# Patient Record
Sex: FEMALE | Race: WHITE | Employment: UNEMPLOYED | URBAN - METROPOLITAN AREA
[De-identification: names, ages, dates, MRNs, and addresses within clinical notes are randomized per-mention and may not be internally consistent; named-entity substitution may affect disease eponyms.]

---

## 2017-07-21 ENCOUNTER — HOSPITAL ENCOUNTER (INPATIENT)
Age: 37
LOS: 20 days | Discharge: HOME | DRG: 750 | End: 2017-08-10
Attending: PSYCHIATRY & NEUROLOGY | Admitting: PSYCHIATRY & NEUROLOGY
Payer: MEDICAID

## 2017-07-21 PROBLEM — F29 PSYCHOSIS (HCC): Status: ACTIVE | Noted: 2017-07-21

## 2017-07-21 PROCEDURE — 74011250637 HC RX REV CODE- 250/637: Performed by: PSYCHIATRY & NEUROLOGY

## 2017-07-21 PROCEDURE — 65220000003 HC RM SEMIPRIVATE PSYCH

## 2017-07-21 RX ORDER — IBUPROFEN 400 MG/1
400 TABLET ORAL
Status: DISCONTINUED | OUTPATIENT
Start: 2017-07-21 | End: 2017-08-10 | Stop reason: HOSPADM

## 2017-07-21 RX ORDER — IBUPROFEN 200 MG
1 TABLET ORAL
Status: DISCONTINUED | OUTPATIENT
Start: 2017-07-21 | End: 2017-08-10 | Stop reason: HOSPADM

## 2017-07-21 RX ORDER — LORAZEPAM 2 MG/ML
2 INJECTION INTRAMUSCULAR
Status: DISCONTINUED | OUTPATIENT
Start: 2017-07-21 | End: 2017-08-10 | Stop reason: HOSPADM

## 2017-07-21 RX ORDER — BENZTROPINE MESYLATE 2 MG/1
2 TABLET ORAL
Status: DISCONTINUED | OUTPATIENT
Start: 2017-07-21 | End: 2017-08-10 | Stop reason: HOSPADM

## 2017-07-21 RX ORDER — OLANZAPINE 5 MG/1
5 TABLET ORAL
Status: DISCONTINUED | OUTPATIENT
Start: 2017-07-21 | End: 2017-08-10 | Stop reason: HOSPADM

## 2017-07-21 RX ORDER — BENZTROPINE MESYLATE 1 MG/ML
2 INJECTION INTRAMUSCULAR; INTRAVENOUS
Status: DISCONTINUED | OUTPATIENT
Start: 2017-07-21 | End: 2017-08-10 | Stop reason: HOSPADM

## 2017-07-21 RX ORDER — CARBAMAZEPINE 100 MG/1
100 TABLET, EXTENDED RELEASE ORAL 2 TIMES DAILY
COMMUNITY
End: 2017-08-10

## 2017-07-21 RX ORDER — ACETAMINOPHEN 325 MG/1
650 TABLET ORAL
Status: DISCONTINUED | OUTPATIENT
Start: 2017-07-21 | End: 2017-08-10 | Stop reason: HOSPADM

## 2017-07-21 RX ORDER — ZOLPIDEM TARTRATE 10 MG/1
10 TABLET ORAL
Status: DISCONTINUED | OUTPATIENT
Start: 2017-07-21 | End: 2017-08-04

## 2017-07-21 RX ORDER — ADHESIVE BANDAGE
30 BANDAGE TOPICAL DAILY PRN
Status: DISCONTINUED | OUTPATIENT
Start: 2017-07-21 | End: 2017-08-10 | Stop reason: HOSPADM

## 2017-07-21 RX ORDER — LORAZEPAM 1 MG/1
1 TABLET ORAL
Status: DISCONTINUED | OUTPATIENT
Start: 2017-07-21 | End: 2017-08-10 | Stop reason: HOSPADM

## 2017-07-21 RX ADMIN — ZOLPIDEM TARTRATE 10 MG: 10 TABLET, FILM COATED ORAL at 21:56

## 2017-07-21 NOTE — IP AVS SNAPSHOT
Summary of Care Report The Summary of Care report has been created to help improve care coordination. Users with access to [employer] or [address] (Web-based application) may access additional patient information including the Discharge Summary. If you are not currently a [address] user and need more information, please call the number listed below in the [place] [address] section and ask to be connected with Medical Records. Facility Information Name Address Phone [facility] [address] [place] [id number] [phone] Patient Information Patient Name Sex DOB Hoyle Gowers ([id number]) Female 1980 Discharge Information Admitting Provider Service Area Unit Martin Velasquez MD / [address] Acute Behav [facility] / [phone] Discharge Provider Discharge Date/Time Discharge Disposition Destination Juan Reza MD / [phone] 08/10/17 1043 [facility] (none) Patient Language Language ENGLISH [13] Hospital Problems as of 8/10/2017  Never Reviewed Class Noted - Resolved Last Modified POA Active Problems * (Principal)Schizoaffective disorder, chronic condition with acute exacerbation ([facility] [address]  2017 - Present 2017 by Kiara Abarca MD Unknown Entered by Kiara Abarca MD  
  Noncompliance with treatment  2017 - Present 2017 by Kiara Abarca MD Unknown Entered by Kiara Abarca MD  
  
Non-Hospital Problems as of 8/10/2017  Never Reviewed Class Noted - Resolved Last Modified Active Problems Psychosis  2017 - Present 2017 by Kiara Abarca MD  
  Entered by Martin Velasquez MD  
  
You are allergic to the following Allergen Reactions Fish Containing Products Anaphylaxis Sea Food (Seafood) Anaphylaxis Current Discharge Medication List  
  
START taking these medications Dose & Instructions Dispensing Information Comments  
 clonazePAM 1 mg tablet Commonly known as:  Dala Pleasure Dose:  1 mg Take 1 Tab by mouth nightly for 21 days. Max Daily Amount: 1 mg. Indications: Agitation, anxiety Quantity:  21 Tab Refills:  0  
   
 divalproex  mg ER tablet Commonly known as:  DEPAKOTE ER Dose:  1500 mg Take 3 Tabs by mouth nightly for 21 days. Indications: Schizoaffective disorder Quantity:  63 Tab Refills:  0  
   
 * OLANZapine 10 mg tablet Commonly known as:  ZyPREXA Dose:  10 mg Take 1 Tab by mouth daily for 21 days. Indications: Ana associated with Bipolar Disorder Quantity:  21 Tab Refills:  0  
   
 * OLANZapine 15 mg tablet Commonly known as:  ZyPREXA Dose:  30 mg Take 2 Tabs by mouth nightly for 21 days. Indications: Ana associated with Bipolar Disorder, Schizoaffective Disorder Quantity:  42 Tab Refills:  0  
   
 * Notice: This list has 2 medication(s) that are the same as other medications prescribed for you. Read the directions carefully, and ask your doctor or other care provider to review them with you. STOP taking these medications Comments TEGretol  mg SR tablet Generic drug:  carBAMazepine XR Follow-up Information Follow up With Details Comments Contact Info [place] On 8/14/2017 intake appointment at 9:15 AM to begin process for  and psychiatric medication management. [address] [place] [address] [address] [place] [address] 
[phone] Emergency services: [phone] EHS Go on 8/16/2017 8/16/17 at 12:00 PM   Assessment will be conducted by Dewayne Phillips for mental health skill building services [address] [place] [address] Phone: [phone] Monday - 8/16/17  at 12:00 PM 
  
 None   None (395) Patient stated that they have no PCP Discharge Instructions DISCHARGE SUMMARY from Nurse The following personal items are in your possession at time of discharge: 
 
Dental Appliances: None Visual Aid: None Home Medications: None Jewelry: None Clothing: None Other Valuables: None Personal Items Sent to Safe: none PATIENT INSTRUCTIONS: 
 
 
 
What to do at Home: 
 
Recommended activity: Activity as tolerated, *  Please give a list of your current medications to your Primary Care Provider. *  Please update this list whenever your medications are discontinued, doses are 
    changed, or new medications (including over-the-counter products) are added. *  Please carry medication information at all times in case of emergency situations. These are general instructions for a healthy lifestyle: No smoking/ No tobacco products/ Avoid exposure to second hand smoke Surgeon General's Warning:  Quitting smoking now greatly reduces serious risk to your health. Obesity, smoking, and sedentary lifestyle greatly increases your risk for illness A healthy diet, regular physical exercise & weight monitoring are important for maintaining a healthy lifestyle You may be retaining fluid if you have a history of heart failure or if you experience any of the following symptoms:  Weight gain of 3 pounds or more overnight or 5 pounds in a week, increased swelling in our hands or feet or shortness of breath while lying flat in bed. Please call your doctor as soon as you notice any of these symptoms; do not wait until your next office visit. Recognize signs and symptoms of STROKE: 
 
F-face looks uneven A-arms unable to move or move unevenly S-speech slurred or non-existent T-time-call 911 as soon as signs and symptoms begin-DO NOT go Back to bed or wait to see if you get better-TIME IS BRAIN.  
 
Warning Signs of HEART ATTACK  
 
 Call 911 if you have these symptoms: 
? Chest discomfort. Most heart attacks involve discomfort in the center of the chest that lasts more than a few minutes, or that goes away and comes back. It can feel like uncomfortable pressure, squeezing, fullness, or pain. ? Discomfort in other areas of the upper body. Symptoms can include pain or discomfort in one or both arms, the back, neck, jaw, or stomach. ? Shortness of breath with or without chest discomfort. ? Other signs may include breaking out in a cold sweat, nausea, or lightheadedness. Don't wait more than five minutes to call [address]! Fast action can save your life. Calling 911 is almost always the fastest way to get lifesaving treatment. Emergency Medical Services staff can begin treatment when they arrive  up to an hour sooner than if someone gets to the hospital by car. The discharge information has been reviewed with the patient. The patient verbalized understanding. Discharge medications reviewed with the patient and appropriate educational materials and side effects teaching were provided. If I feel that I can not keep these promises and I am at risk of hurting myself or others,I will call the crisis office and speak with a crisis worker who will assist me during my crisis. [address] [place] [address] [phone] [address] [place] [address] [phone] [facility] [phone] [address]. Miguel Londono [phone] Chart Review Routing History No Routing History on File

## 2017-07-21 NOTE — CONSULTS
Medical Consult for [facility] Patient    Consult H&P   dictated, see patient chart    Impression:    Gemma Sarah a 30 y.o. female with past medical history of mental health disease and htn presents with behavioral health problems of psychosis admitted for further psychiatric evaluation and treatment. Plan:   1. Psychiatry to manage mental health issues  2. Continuing home medication, once confirmed by nursing. 3. Medically stable at this time, will follow up as needed. 4. No VTE prophylaxis indicated or necessary at this time.      Thank you  Jossy Gay MD  7/21/2017, 7:28 PM

## 2017-07-21 NOTE — IP AVS SNAPSHOT
[facility] 
 
 
 [address] Patient: Michelle Mankeeper MRN: [id number] [facility]:7/58/7505 You are allergic to the following Allergen Reactions Fish Containing Products Anaphylaxis Sea Food (Seafood) Anaphylaxis Recent Documentation Height Weight Breastfeeding? BMI Smoking Status 1.6 m 68 kg No 26.57 kg/m2 Never Smoker Emergency Contacts Name Discharge Info Relation Home Work Mobile Mio Kiser  Friend [] [phone] About your hospitalization You were admitted on:  July 21, 2017 You last received care in the:  [address] You were discharged on:  August 10, 2017 Unit phone number:  [phone] Why you were hospitalized Your primary diagnosis was:  Schizoaffective Disorder, Chronic Condition With Acute Exacerbation (Hcc) Your diagnoses also included:  Noncompliance With Treatment Providers Seen During Your Hospitalizations Provider Role Specialty Primary office phone Arlet Alexandre MD Attending Provider Psychiatry [phone] Your Primary Care Physician (PCP) Primary Care Physician Office Phone Office Fax NONE ** None ** ** None ** Follow-up Information Follow up With Details Comments Contact Info [place] On 8/14/2017 intake appointment at 9:15 AM to begin process for  and psychiatric medication management. [address] [place] [address] [address] 
[phone] Emergency services: [phone] EHS Go on 8/16/2017 8/16/17 at 12:00 PM   Assessment will be conducted by Bibi Montiel for mental health skill building services [address] [place] [address] Phone: [phone] Monday - 8/16/17  at 12:00 PM 
  
 None   None (395) Patient stated that they have no PCP 
  
  
  
 
  
  
  
 Current Discharge Medication List  
  
START taking these medications Dose & Instructions Dispensing Information Comments Morning Noon Evening Bedtime  
 clonazePAM 1 mg tablet Commonly known as:  Arlyn Kulkarni Your last dose was: Your next dose is:    
   
   
 Dose:  1 mg Take 1 Tab by mouth nightly for 21 days. Max Daily Amount: 1 mg. Indications: Agitation, anxiety Quantity:  21 Tab Refills:  0  
     
   
   
   
  
 divalproex  mg ER tablet Commonly known as:  DEPAKOTE ER Your last dose was: Your next dose is:    
   
   
 Dose:  1500 mg Take 3 Tabs by mouth nightly for 21 days. Indications: Schizoaffective disorder Quantity:  63 Tab Refills:  0  
     
   
   
   
  
 * OLANZapine 10 mg tablet Commonly known as:  ZyPREXA Your last dose was: Your next dose is:    
   
   
 Dose:  10 mg Take 1 Tab by mouth daily for 21 days. Indications: Ana associated with Bipolar Disorder Quantity:  21 Tab Refills:  0  
     
   
   
   
  
 * OLANZapine 15 mg tablet Commonly known as:  ZyPREXA Your last dose was: Your next dose is:    
   
   
 Dose:  30 mg Take 2 Tabs by mouth nightly for 21 days. Indications: Ana associated with Bipolar Disorder, Schizoaffective Disorder Quantity:  42 Tab Refills:  0  
     
   
   
   
  
 * Notice: This list has 2 medication(s) that are the same as other medications prescribed for you. Read the directions carefully, and ask your doctor or other care provider to review them with you. STOP taking these medications TEGretol  mg SR tablet Generic drug:  carBAMazepine XR Where to Get Your Medications Information on where to get these meds will be given to you by the nurse or doctor. ! Ask your nurse or doctor about these medications  
  clonazePAM 1 mg tablet  
 divalproex  mg ER tablet OLANZapine 10 mg tablet OLANZapine 15 mg tablet Discharge Instructions DISCHARGE SUMMARY from Nurse The following personal items are in your possession at time of discharge: 
 
Dental Appliances: None Visual Aid: None Home Medications: None Jewelry: None Clothing: None Other Valuables: None Personal Items Sent to Safe: none PATIENT INSTRUCTIONS: 
 
 
 
What to do at Home: 
 
Recommended activity: Activity as tolerated, *  Please give a list of your current medications to your Primary Care Provider. *  Please update this list whenever your medications are discontinued, doses are 
    changed, or new medications (including over-the-counter products) are added. *  Please carry medication information at all times in case of emergency situations. These are general instructions for a healthy lifestyle: No smoking/ No tobacco products/ Avoid exposure to second hand smoke Surgeon General's Warning:  Quitting smoking now greatly reduces serious risk to your health. Obesity, smoking, and sedentary lifestyle greatly increases your risk for illness A healthy diet, regular physical exercise & weight monitoring are important for maintaining a healthy lifestyle You may be retaining fluid if you have a history of heart failure or if you experience any of the following symptoms:  Weight gain of 3 pounds or more overnight or 5 pounds in a week, increased swelling in our hands or feet or shortness of breath while lying flat in bed. Please call your doctor as soon as you notice any of these symptoms; do not wait until your next office visit. Recognize signs and symptoms of STROKE: 
 
F-face looks uneven A-arms unable to move or move unevenly S-speech slurred or non-existent T-time-call 911 as soon as signs and symptoms begin-DO NOT go Back to bed or wait to see if you get better-TIME IS BRAIN.  
 
Warning Signs of HEART ATTACK  
 
 Call 911 if you have these symptoms: 
? Chest discomfort. Most heart attacks involve discomfort in the center of the chest that lasts more than a few minutes, or that goes away and comes back. It can feel like uncomfortable pressure, squeezing, fullness, or pain. ? Discomfort in other areas of the upper body. Symptoms can include pain or discomfort in one or both arms, the back, neck, jaw, or stomach. ? Shortness of breath with or without chest discomfort. ? Other signs may include breaking out in a cold sweat, nausea, or lightheadedness. Don't wait more than five minutes to call [address]! Fast action can save your life. Calling 911 is almost always the fastest way to get lifesaving treatment. Emergency Medical Services staff can begin treatment when they arrive  up to an hour sooner than if someone gets to the hospital by car. The discharge information has been reviewed with the patient. The patient verbalized understanding. Discharge medications reviewed with the patient and appropriate educational materials and side effects teaching were provided. If I feel that I can not keep these promises and I am at risk of hurting myself or others,I will call the crisis office and speak with a crisis worker who will assist me during my crisis. [address] [place] [address] [phone] [address] [place] [address] [phone] [facility] [phone] [facility] Miguel Londono  [phone] Discharge Orders None [facility] Announcement We are excited to announce that we are making your provider's discharge notes available to you in [employer]. You will see these notes when they are completed and signed by the physician that discharged you from your recent hospital stay.   If you have any questions or concerns about any information you see in [employer], please call the [employer] Information Department where you were seen or reach out to your Primary Care Provider for more information about your plan of care. Introducing [facility]! Rachel Whitehead introduces [employer] patient portal. Now you can access parts of your medical record, email your doctor's office, and request medication refills online. 1. In your internet browser, go to [email or web address] 2. Click on the First Time User? Click Here link in the Sign In box. You will see the New Member Sign Up page. 3. Enter your [employer] Access Code exactly as it appears below. You will not need to use this code after youve completed the sign-up process. If you do not sign up before the expiration date, you must request a new code. · [employer] Access Code: [email or web address] Expires: 10/31/2017 10:17 AM 
 
4. Enter the last four digits of your Social Security Number (xxxx) and Date of Birth (mm/dd/yyyy) as indicated and click Submit. You will be taken to the next sign-up page. 5. Create a [employer] ID. This will be your [employer] login ID and cannot be changed, so think of one that is secure and easy to remember. 6. Create a [employer] password. You can change your password at any time. 7. Enter your Password Reset Question and Answer. This can be used at a later time if you forget your password. 8. Enter your e-mail address. You will receive e-mail notification when new information is available in [address] 9. Click Sign Up. You can now view and download portions of your medical record. 10. Click the Download Summary menu link to download a portable copy of your medical information. If you have questions, please visit the Frequently Asked Questions section of the [employer] website. Remember, [employer] is NOT to be used for urgent needs. For medical emergencies, dial 911. Now available from your iPhone and Android! General Information Please provide this summary of care documentation to your next provider. Patient Signature:  ____________________________________________________________ Date:  ____________________________________________________________  
  
Fidelia Pane Provider Signature:  ____________________________________________________________ Date:  ____________________________________________________________

## 2017-07-21 NOTE — IP AVS SNAPSHOT
[facility] 
 
 
 [address] Patient: Meme March MRN: [id number] VPR:[id number] Current Discharge Medication List  
  
START taking these medications Dose & Instructions Dispensing Information Comments Morning Noon Evening Bedtime  
 clonazePAM 1 mg tablet Commonly known as:  Dala Pleasure Your last dose was: Your next dose is:    
   
   
 Dose:  1 mg Take 1 Tab by mouth nightly for 21 days. Max Daily Amount: 1 mg. Indications: Agitation, anxiety Quantity:  21 Tab Refills:  0  
     
   
   
   
  
 divalproex  mg ER tablet Commonly known as:  DEPAKOTE ER Your last dose was: Your next dose is:    
   
   
 Dose:  1500 mg Take 3 Tabs by mouth nightly for 21 days. Indications: Schizoaffective disorder Quantity:  63 Tab Refills:  0  
     
   
   
   
  
 * OLANZapine 10 mg tablet Commonly known as:  ZyPREXA Your last dose was: Your next dose is:    
   
   
 Dose:  10 mg Take 1 Tab by mouth daily for 21 days. Indications: Aan associated with Bipolar Disorder Quantity:  21 Tab Refills:  0  
     
   
   
   
  
 * OLANZapine 15 mg tablet Commonly known as:  ZyPREXA Your last dose was: Your next dose is:    
   
   
 Dose:  30 mg Take 2 Tabs by mouth nightly for 21 days. Indications: Ana associated with Bipolar Disorder, Schizoaffective Disorder Quantity:  42 Tab Refills:  0  
     
   
   
   
  
 * Notice: This list has 2 medication(s) that are the same as other medications prescribed for you. Read the directions carefully, and ask your doctor or other care provider to review them with you. STOP taking these medications TEGretol  mg SR tablet Generic drug:  carBAMazepine XR Where to Get Your Medications Information on where to get these meds will be given to you by the nurse or doctor. ! Ask your nurse or doctor about these medications  
  clonazePAM 1 mg tablet  
 divalproex  mg ER tablet OLANZapine 10 mg tablet OLANZapine 15 mg tablet

## 2017-07-21 NOTE — BH NOTES
Pt is admitted TDO from [facility]. Pt was found wondering in the street with little clothes. Pt denied substance abuse history. Pt reports a history of HTN but no treatment. Pt is alert and oriented x 3. Pt was medicated PRN before arriving unit for agitation. Pt is cooperative presently and able to contract for safety in the hospital. Will monitor q 15 for safety.

## 2017-07-22 PROBLEM — F25.9 SCHIZOAFFECTIVE DISORDER, CHRONIC CONDITION WITH ACUTE EXACERBATION (HCC): Status: ACTIVE | Noted: 2017-07-22

## 2017-07-22 PROBLEM — Z91.199 NONCOMPLIANCE WITH TREATMENT: Status: ACTIVE | Noted: 2017-07-22

## 2017-07-22 LAB
CHOLEST SERPL-MCNC: 132 MG/DL
HDLC SERPL-MCNC: 65 MG/DL
HDLC SERPL: 2 {RATIO} (ref 0–5)
LDLC SERPL CALC-MCNC: 52.2 MG/DL (ref 0–100)
LIPID PROFILE,FLP: NORMAL
TRIGL SERPL-MCNC: 74 MG/DL (ref ?–150)
TSH SERPL DL<=0.05 MIU/L-ACNC: 3.26 UIU/ML (ref 0.36–3.74)
VLDLC SERPL CALC-MCNC: 14.8 MG/DL

## 2017-07-22 PROCEDURE — 80061 LIPID PANEL: CPT | Performed by: PSYCHIATRY & NEUROLOGY

## 2017-07-22 PROCEDURE — 65220000003 HC RM SEMIPRIVATE PSYCH

## 2017-07-22 PROCEDURE — 36415 COLL VENOUS BLD VENIPUNCTURE: CPT | Performed by: PSYCHIATRY & NEUROLOGY

## 2017-07-22 PROCEDURE — 84443 ASSAY THYROID STIM HORMONE: CPT | Performed by: PSYCHIATRY & NEUROLOGY

## 2017-07-22 PROCEDURE — 74011250637 HC RX REV CODE- 250/637

## 2017-07-22 PROCEDURE — 74011250637 HC RX REV CODE- 250/637: Performed by: PSYCHIATRY & NEUROLOGY

## 2017-07-22 RX ORDER — OLANZAPINE 5 MG/1
5 TABLET ORAL
Status: DISCONTINUED | OUTPATIENT
Start: 2017-07-22 | End: 2017-07-24

## 2017-07-22 RX ADMIN — LORAZEPAM 1 MG: 1 TABLET ORAL at 09:48

## 2017-07-22 RX ADMIN — OLANZAPINE 5 MG: 5 TABLET, FILM COATED ORAL at 21:29

## 2017-07-22 NOTE — PROGRESS NOTES
Problem: Altered Thought Process (Adult/Pediatric)  Goal: *STG: Remains safe in hospital  Outcome: Progressing Towards Goal  Patient has been visible on the unit, pleasant in her interactions with this writer. Little interaction noted with peers but patient is calm and cooperative in her interactions with staff. Patient says thank you repeatedly, and is overly apologetic. Patient has no scheduled medication this shift. Patient will continue to be monitored and will remain on q15 min safety rounds with support offered as needed.

## 2017-07-22 NOTE — BH NOTES
Patient pulled this writer to the side and stated that she wanted to talk. Patient stated that she did not \"visualize things the way that other people visualize things,\" and that she was \"colorblind. \" Patient also stated that she \"visualized music because she couldn't hear. \" Patient asked to be tested for pregnancy because according to her before she came into the hospital she had been not feeling well and \"passing out. \" Patient assured that she was likely tested prior to admission and that someone would have informed her if it were positive. Per pt chart her HCG was negative.

## 2017-07-22 NOTE — BH NOTES
Pt spent the shift sleeping, only up to have needs met. Pt was calm and cooperative when approached. Pt did not express any concerns or complaints at this time. Pt stated she was \"just going to relax today\". Pt will continue to be monitored for safety.

## 2017-07-22 NOTE — BH NOTES
GROUP THERAPY PROGRESS NOTE    The patient Judith Lopez is participating in [employer]. Group time: 30 minutes    Personal goal for participation: to orient the patient to the unit.     Goal orientation: successful adoption of unit rules    Group therapy participation: minimal    Therapeutic interventions reviewed and discussed: Yes    Impression of participation: minimal    Gurwinder Olivarez  7/22/2017 12:11 PM

## 2017-07-22 NOTE — PROGRESS NOTES
Problem: Altered Thought Process (Adult/Pediatric)  Goal: *STG: Seeks staff when feelings of anxiety and fear arise  Outcome: Progressing Towards Goal  Pt is compliant with medication. She is repetitive and preoccupied with medications. Pt requires redirection for being intrusive with staff. Pt denies A/V hallucinations and denies S/H ideations. Pt has poor insight. Pt states that she is only here because she is \"trying to help everyone else\". Pt is visible on the unit. Will continue to monitor pt q15 minutes for safety and support.

## 2017-07-22 NOTE — BH NOTES
INITIAL PSYCHIATRIC EVALUATION         IDENTIFICATION:    Patient Name  Nanette Joshua   Date of Birth 1980   [facility] [id number]   Medical Record Number  [id number]      Age  30 y.o. PCP None   Admit date:  7/21/2017    Room Number  315/02  @ [facility]   Date of Service  7/22/2017            HISTORY         REASON FOR HOSPITALIZATION:  CC: \"I passed out our food in the refrigerator was bad  \". Pt admitted under a [facility] residential order (TDO)   for severe psychosis and wendy proving to be/posing an imminent danger to selfand an inability to care for self. HISTORY OF PRESENT ILLNESS:    The patient, Nanette Joshua, is a 30 y.o. WHITE [place]  female with a past psychiatric history significant for Schizoaffective disorder bipolar type , who presents at this time with complaints of (and/or evidence of) the following emotional symptoms: agitation, psychotic behavior, wendy and psychosis. Additional symptomatology include agitation, difficulty sleeping and increased irritability,she was found wandering around ,confuse,disoritented,unkempt , not wearing shoes or undergarments. The above symptoms have been present for one day . These symptoms are of moderate in  severity. These symptoms are constant  in nature. The patient's condition has been precipitated by and psychosocial stressors . treatment noncompliance. UDS: negative; BAL=0. Patient reported that she has been diagnosed with Schizoaffective disorder, since last 2 years she is not taking any medications and not following any psychiatrist. She reported that she has been tried on Tegretol,depakote, lithium ,haldol ,Risperidone and Zyprexa but she is not reliable. ALLERGIES:  No Known Allergies   MEDICATIONS PRIOR TO ADMISSION:  Prescriptions Prior to Admission   Medication Sig    carBAMazepine XR (TEGRETOL XR) 100 mg SR tablet Take 100 mg by mouth two (2) times a day.  Indications: Wendy associated with Bipolar Disorder, MIXED BIPOLAR I DISORDER      PAST MEDICAL HISTORY:  Past Medical History:   Diagnosis Date    Anxiety disorder     Depression     Hypertension     Psychotic disorder      Past Surgical History:   Procedure Laterality Date    HX GYN        SOCIAL HISTORY:   Social History     Social History    Marital status: SINGLE     Spouse name: N/A    Number of children: N/A    Years of education: N/A     Occupational History    Not on file. Social History Main Topics    Smoking status: Never Smoker    Smokeless tobacco: Never Used    Alcohol use No    Drug use: No    Sexual activity: Yes     Other Topics Concern    Not on file     Social History Narrative    No narrative on file      FAMILY HISTORY: History reviewed. No pertinent family history. History reviewed. No pertinent family history. REVIEW OF SYSTEMS:   Psychological ROS: positive for - behavioral disorder, concentration difficulties, irritability and mood swings  Respiratory ROS: no cough, shortness of breath, or wheezing  Cardiovascular ROS: no chest pain or dyspnea on exertion  Pertinent items are noted in the History of Present Illness. All other Systems reviewed and are considered negative. MENTAL STATUS EXAM & VITALS         MENTAL STATUS EXAM (MSE):    MSE FINDINGS ARE WITHIN NORMAL LIMITS (WNL) UNLESS OTHERWISE STATED BELOW. ( ALL OF THE BELOW CATEGORIES OF THE MSE HAVE BEEN REVIEWED (reviewed 7/22/2017) AND UPDATED AS DEEMED APPROPRIATE )  General Presentation age appropriate, casually dressed and disheveled, uncooperative and unreliable   Orientation oriented to time, place and person   Vital Signs  See below (reviewed 7/22/2017); Vital Signs (BP, Pulse, & Temp) are within normal limits if not listed below.    Gait and Station Stable/steady, no ataxia   Musculoskeletal System No extrapyramidal symptoms (EPS); no abnormal muscular movements or Tardive Dyskinesia (TD); muscle strength and tone are within normal limits   Language No aphasia or dysarthria   Speech:  hyperverbal   Thought Processes illogical; fast rate of thoughts; poor abstract reasoning/computation   Thought Associations loose associations and tangential   Thought Content paranoid delusions and not internally preoccupied   Suicidal Ideations no plan  and no intention   Homicidal Ideations no plan  and no intention   Mood:  anxious , irritable and labile    Affect:  anxious, irritable and labile   Memory recent  intact   Memory remote:  intact   Concentration/Attention:  distractable   Fund of Knowledge average   Insight:  poor   Reliability poor   Judgment:  poor            VITALS:     Patient Vitals for the past 24 hrs:   Temp Pulse Resp BP   07/22/17 0654 98.3 °F (36.8 °C) 80 16 113/67   07/21/17 1655 - 91 18 134/86   07/21/17 1441 97.3 °F (36.3 °C) 73 18 131/84     Wt Readings from Last 3 Encounters:   07/21/17 68 kg (150 lb)     Temp Readings from Last 3 Encounters:   07/22/17 98.3 °F (36.8 °C)     BP Readings from Last 3 Encounters:   07/22/17 113/67     Pulse Readings from Last 3 Encounters:   07/22/17 80            DATA       LABORATORY DATA:  Labs Reviewed   TSH 3RD GENERATION   LIPID PANEL     Admission on 07/21/2017   Component Date Value Ref Range Status    TSH 07/22/2017 3.26  0.36 - 3.74 uIU/mL Final    LIPID PROFILE 07/22/2017        Final    Cholesterol, total 07/22/2017 132  <200 MG/DL Final    Triglyceride 07/22/2017 74  <150 MG/DL Final    HDL Cholesterol 07/22/2017 65  MG/DL Final    LDL, calculated 07/22/2017 52.2  0 - 100 MG/DL Final    VLDL, calculated 07/22/2017 14.8  MG/DL Final    CHOL/HDL Ratio 07/22/2017 2.0  0 - 5.0   Final        RADIOLOGY REPORTS:  No results found for this or any previous visit. No results found.            MEDICATIONS       ALL MEDICATIONS  Current Facility-Administered Medications   Medication Dose Route Frequency    OLANZapine (ZyPREXA) tablet 5 mg  5 mg Oral QHS    ziprasidone (GEODON) 20 mg in sterile water (preservative free) 1 mL injection  20 mg IntraMUSCular BID PRN    OLANZapine (ZyPREXA) tablet 5 mg  5 mg Oral Q6H PRN    benztropine (COGENTIN) tablet 2 mg  2 mg Oral BID PRN    benztropine (COGENTIN) injection 2 mg  2 mg IntraMUSCular BID PRN    LORazepam (ATIVAN) injection 2 mg  2 mg IntraMUSCular Q4H PRN    LORazepam (ATIVAN) tablet 1 mg  1 mg Oral Q4H PRN    zolpidem (AMBIEN) tablet 10 mg  10 mg Oral QHS PRN    acetaminophen (TYLENOL) tablet 650 mg  650 mg Oral Q4H PRN    ibuprofen (MOTRIN) tablet 400 mg  400 mg Oral Q8H PRN    magnesium hydroxide (MILK OF MAGNESIA) 400 mg/5 mL oral suspension 30 mL  30 mL Oral DAILY PRN    nicotine (NICODERM CQ) 21 mg/24 hr patch 1 Patch  1 Patch TransDERmal DAILY PRN      SCHEDULED MEDICATIONS  Current Facility-Administered Medications   Medication Dose Route Frequency    OLANZapine (ZyPREXA) tablet 5 mg  5 mg Oral QHS                ASSESSMENT & PLAN        The patient Mejia Funes is a 30 y.o.  female who presents at this time for treatment of the following diagnoses:  Patient C/Job Sanchez 1106 Problem List:   Schizoaffective disorder, chronic condition with acute exacerbation ([facility] [address] (7/22/2017)    Assessment: Acute exacerbation of manic and psychotic symptoms,loos association,irritable ,hyperverbal,flight of ideas,intrusive,responding to internal stimuli,labile    Plan: will treat with antipsychotic medications ,patient agrees to accept Zyprexa, use ativan prn for agitations and anxiety,monotir behavior,may consider mood stabilizer but patient does not want any medications ,provide supportive therapy. Collateral information   Noncompliance with treatment (7/22/2017)    Assessment: Chronic problem due to poor insight      Plan: Educate about the need for medications ,consider Long Acting Antipsychotic med.           In summary, Mejia Funes presents with a severe exacerbation of the principal diagnosis, Schizoaffective disorder, chronic condition with acute exacerbation ([facility] [address]    While on the unit Judith Lopez will be provided with individual, milieu, occupational, group, and substance abuse therapies to address target symptoms as deemed appropriate for the individual patient. I will continue to monitor blood levels (Depakote, Tegretol, lithium, clozapine---a drug with a narrow therapeutic index= NTI) and associated labs for drug therapy implemented that require intense monitoring for toxicity as deemed appropriate base on current medication side effects and pharmacodynamically determined drug 1/2 lives. I agree with decision to admit patient. I have spoken to [facility] psychiatric /ED staff regarding the nature of patients's admission at this time. A coordinated, multidisplinary treatment team (includes the nurse, unit pharmcist,  and writer) round was conducted for this initial evaluation with the patient present. The following regarding medications was addressed during rounds with patient:   the risks and benefits of the proposed medication. The patient was given the opportunity to ask questions. Informed consent given to the use of the above medications. I will continue to adjust psychiatric and non-psychiatric medications (see above \"medication\" section and orders section for details) as deemed appropriate & based upon diagnoses and response to treatment. I have reviewed admission (and previous/old) labs and medical tests in the EHR and or transferring hospital documents. I will continue to order blood tests/labs and diagnostic tests as deemed appropriate and review results as they become available (see orders for details). I have reviewed old psychiatric and medical records available in the EHR. I Will order additional psychiatric records from other institutions to further elucidate the nature of patient's psychopathology and review once available.     I will gather additional collateral information from friends, family and o/p treatment team to further elucidate the nature of patient's psychopathology and baselline level of psychiatric functioning.         ESTIMATED LENGTH OF STAY:    5-7 days       STRENGTHS:  Exercising self-direction/Resourceful, Access to housing/residential stability and Financial stability                      SIGNED:    Sebastian Lee MD  7/22/2017

## 2017-07-23 PROCEDURE — 74011250637 HC RX REV CODE- 250/637

## 2017-07-23 PROCEDURE — 74011250637 HC RX REV CODE- 250/637: Performed by: PSYCHIATRY & NEUROLOGY

## 2017-07-23 PROCEDURE — 65220000003 HC RM SEMIPRIVATE PSYCH

## 2017-07-23 RX ADMIN — LORAZEPAM 1 MG: 1 TABLET ORAL at 09:21

## 2017-07-23 RX ADMIN — OLANZAPINE 5 MG: 5 TABLET, FILM COATED ORAL at 21:31

## 2017-07-23 NOTE — CONSULTS
[address] [facility]   [address] [place] [address], [address]   [address] [facility]       Name:  Khoa Mejia   MR#:  [id number]   :  1980   Account #:  [de-identified]    Date of Consultation:  2017   Date of Adm:  2017       REFERRING PHYSICIAN: Lamar Recio MD    REASON FOR CONSULTATION: Medical evaluation for psychiatric   admission. CHIEF COMPLAINT: Psychosis. HISTORY OF PRESENT ILLNESS: A 29-year-old female presents   psychotic, requiring further psychiatric evaluation and treatment. She   was found wandering and in appropriate, was cleared at [facility]. Denies any chest pain, shortness of breath, nausea,   vomiting, or diarrhea. States she has been diagnosed with   hypertension in the past, but had to be taken off because it bottomed   out her blood pressure. PAST MEDICAL HISTORY: Mental health disease, hypertension,   but rule out epilepsy. ALLERGIES: NONE. MEDICATIONS: Carbamazepine  mg. PAST SURGICAL HISTORY: Bilateral tubal ligation. SOCIAL HISTORY: Denies any tobacco, alcohol or illicit drug use. Single, has 2 kids. The patient is disabled secondary to mental health   disease. PHYSICAL EXAMINATION   VITAL SIGNS: Temperature 97.3, blood pressure 131/84, pulse 73,   respirations 18, weight 150 pounds. GENERAL: Pleasant, no acute distress. HEENT: Oropharynx is clear. NECK: Supple. LUNGS: Clear to auscultation. No wheezes, rales or rhonchi. CARDIOVASCULAR: Regular rate. No murmurs, gallops, or rubs. ABDOMEN: Soft, nontender, nondistended, normoactive bowel   sounds. No hepatosplenomegaly. EXTREMITIES: No cyanosis, clubbing, or edema. LABORATORY DATA: Pending from outside.     IMPRESSION: A 29-year-old female with past medical history of   mental health disease, borderline hypertension, rule out epilepsy,   presents with psychosis, admitted for further psychiatric evaluation and treatment. PLAN:   1. Psychiatry management of mental health issues. 2. Continue home medications once confirmed by nursing. 3. Medically stable at this time. Will followup on a p.r.n. basis. 4. No VTE prophylaxis indicated or warranted at this time. Thank you for this consult.          Lyle Calvillo MD      DC / CD   D:  07/21/2017   19:40   T:  07/23/2017   09:15   Job #:  [id number]

## 2017-07-23 NOTE — PROGRESS NOTES
Problem: Altered Thought Process (Adult/Pediatric)  Goal: *STG: Complies with medication therapy  Outcome: Progressing Towards Goal  Pt states that she is not taking her medication tonight because she has been off of medications of two years. Pt denies A/V hallucinations and denies S/H ideations. Pt requires redirection for being intrusive with staff. Pt has a labile mood and flat affect. Will continue to monitor pt q15 minutes for safety and support.

## 2017-07-23 NOTE — BH NOTES
GROUP THERAPY PROGRESS NOTE    Gisela Ojeda is participating in [facility].      Group time: 30 minutes    Personal goal for participation:  Unit orientation    Goal orientation: Community    Group therapy participation: active    Therapeutic interventions reviewed and discussed: Yes    Impression of participation: good

## 2017-07-23 NOTE — BH NOTES
Pt resting quietly in bed with eyes closed, respiration even and unlabored, no sign of any distress at this time. Pt will continue to be monitored per hospital protocol for safety and needs. Pt slept about 8 hours.
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
## 2017-07-24 PROCEDURE — 65220000003 HC RM SEMIPRIVATE PSYCH

## 2017-07-24 PROCEDURE — 74011250637 HC RX REV CODE- 250/637: Performed by: PSYCHIATRY & NEUROLOGY

## 2017-07-24 RX ORDER — OLANZAPINE 10 MG/1
10 TABLET ORAL
Status: DISCONTINUED | OUTPATIENT
Start: 2017-07-24 | End: 2017-07-25

## 2017-07-24 RX ADMIN — LORAZEPAM 1 MG: 1 TABLET ORAL at 16:40

## 2017-07-24 RX ADMIN — LORAZEPAM 1 MG: 1 TABLET ORAL at 08:29

## 2017-07-24 RX ADMIN — OLANZAPINE 10 MG: 10 TABLET, FILM COATED ORAL at 21:02
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
## 2017-07-24 NOTE — BH NOTES
PSYCHIATRIC PROGRESS NOTE         Patient Name  Elizabeth Cowan   Date of Birth 1980   [facility] [id number]   Medical Record Number  [id number]      Age  30 y.o. PCP None   Admit date:  7/21/2017    Room Number  315/02  @ [facility]   Date of Service  7/23/2017          PSYCHOTHERAPY SESSION NOTE:  Length of psychotherapy session: 15 minutes    Main condition/diagnosis/issues treated during session today, 7/23/2017 : compliance coping skills. I employed Cognitive Behavioral therapy techniques, Reality-Oriented psychotherapy, as well as supportive psychotherapy in regards to various ongoing psychosocial stressors, including the following: pre-admission and current problems; housing issues;occupational issues; academic issues; legal issues; medical issues; and stress of hospitalization. Interpersonal relationship issues and psychodynamic conflicts explored. Attempts made to alleviate maladaptive patterns. We, also, worked on issues of denial & effects of substance dependency/use     Overall, patient is not progressing    Treatment Plan Update (reviewed an updated 7/23/2017) : I will modify psychotherapy tx plan by implementing more stress management strategies, building upon cognitive behavioral techniques, increasing coping skills, as well as shoring up psychological defenses). An extended energy and skill set was needed to engage pt in psychotherapy due to some of the following: resistiveness, complexity, negativity, confrontational nature, hostile behaviors, and/or severe abnormalities in thought processes/psychosis resulting in the loss of expressive/receptive language communication skills. E & M PROGRESS NOTE:         HISTORY         HISTORY OF PRESENT ILLNESS/INTERVAL HISTORY:  (reviewed/updated 7/23/2017). CC: \"I passed out our food in the refrigerator was bad  \".  Pt admitted under a [facility] order (TDO)   for severe psychosis and wendy proving to be/posing an imminent danger to selfand an inability to care for self. HISTORY OF PRESENT ILLNESS:    The patient, Ash Cuello, is a 30 y.o. WHITE [place]  female with a past psychiatric history significant for Schizoaffective disorder bipolar type , who presents at this time with complaints of (and/or evidence of) the following emotional symptoms: agitation, psychotic behavior, wendy and psychosis. Additional symptomatology include agitation, difficulty sleeping and increased irritability,she was found wandering around ,confuse,disoritented,unkempt , not wearing shoes or undergarments. The above symptoms have been present for one day . These symptoms are of moderate in  severity. These symptoms are constant  in nature. The patient's condition has been precipitated by and psychosocial stressors . treatment noncompliance. UDS: negative; BAL=0. Patient reported that she has been diagnosed with Schizoaffective disorder, since last 2 years she is not taking any medications and not following any psychiatrist. She reported that she has been tried on Tegretol,depakote, lithium ,haldol ,Risperidone and Zyprexa. She only agree to take [address] presents/reports/evidences the following emotional symptoms today, 7/23/2017:agitation, delusions and wendy. The above symptoms have been present for one day . These symptoms are of moderate in severity. The symptoms are intermittent/ fleeting in nature. Additional symptomatology and features include labile mood,, agitation, increased irritability and relationship difficulties,guared and paranoid,does not want to discuss about her illness. Tolerating Zyprexa well. Compliant with med. SIDE EFFECTS: (reviewed/updated 7/23/2017)  None reported or admitted to.      ALLERGIES:(reviewed/updated 7/23/2017)  No Known Allergies   MEDICATIONS PRIOR TO ADMISSION:(reviewed/updated 7/23/2017)  Prescriptions Prior to Admission Medication Sig    carBAMazepine XR (TEGRETOL XR) 100 mg SR tablet Take 100 mg by mouth two (2) times a day. Indications: Ana associated with Bipolar Disorder, MIXED BIPOLAR I DISORDER      PAST MEDICAL HISTORY: Past medical history from the initial psychiatric evaluation has been reviewed (reviewed/updated 7/23/2017) with no additional updates (I asked patient and no additional past medical history provided). Past Medical History:   Diagnosis Date    Anxiety disorder     Depression     Hypertension     Psychotic disorder      Past Surgical History:   Procedure Laterality Date    HX GYN        SOCIAL HISTORY: Social history from the initial psychiatric evaluation has been reviewed (reviewed/updated 7/23/2017) with no additional updates (I asked patient and no additional social history provided). Social History     Social History    Marital status: SINGLE     Spouse name: N/A    Number of children: N/A    Years of education: N/A     Occupational History    Not on file. Social History Main Topics    Smoking status: Never Smoker    Smokeless tobacco: Never Used    Alcohol use No    Drug use: No    Sexual activity: Yes     Other Topics Concern    Not on file     Social History Narrative    No narrative on file      FAMILY HISTORY: Family history from the initial psychiatric evaluation has been reviewed (reviewed/updated 7/23/2017) with no additional updates (I asked patient and no additional family history provided). History reviewed. No pertinent family history.     REVIEW OF SYSTEMS: (reviewed/updated 7/23/2017)  Appetite:improved   Sleep: improved   All other Review of Systems: Psychological ROS: positive for - hostility, irritability and mood swings  Respiratory ROS: no cough, shortness of breath, or wheezing  Cardiovascular ROS: no chest pain or dyspnea on exertion         [address] [facility] (MSE):    MSE FINDINGS ARE WITHIN NORMAL LIMITS (WNL) UNLESS OTHERWISE STATED BELOW. ( ALL OF THE BELOW CATEGORIES OF THE MSE HAVE BEEN REVIEWED (reviewed 7/23/2017) AND UPDATED AS DEEMED APPROPRIATE )  General Presentation age appropriate and casually dressed, uncooperative and unreliable   Orientation oriented to time, place and person   Vital Signs  See below (reviewed 7/23/2017); Vital Signs (BP, Pulse, & Temp) are within normal limits if not listed below. Gait and Station Stable/steady, no ataxia   Musculoskeletal System No extrapyramidal symptoms (EPS); no abnormal muscular movements or Tardive Dyskinesia (TD); muscle strength and tone are within normal limits   Language No aphasia or dysarthria   Speech:  hyperverbal   Thought Processes illogical; fast rate of thoughts; poor abstract reasoning/computation   Thought Associations tangential   Thought Content free of delusions and free of hallucinations   Suicidal Ideations no plan  and no intention   Homicidal Ideations none   Mood:  anxious , irritable and labile    Affect:  blunted, hostile, irritable and labile   Memory recent  intact   Memory remote:  intact   Concentration/Attention:  intact   Fund of Knowledge average   Insight:  limited   Reliability poor   Judgment:  poor          VITALS:     Patient Vitals for the past 24 hrs:   Temp Pulse Resp BP   07/23/17 1600 98.3 °F (36.8 °C) 96 18 (!) 119/95     Wt Readings from Last 3 Encounters:   07/21/17 68 kg (150 lb)     Temp Readings from Last 3 Encounters:   07/23/17 98.3 °F (36.8 °C)     BP Readings from Last 3 Encounters:   07/23/17 (!) 119/95     Pulse Readings from Last 3 Encounters:   07/23/17 96            DATA     LABORATORY DATA:(reviewed/updated 7/23/2017)  No results found for this or any previous visit (from the past 24 hour(s)). No results found for: VALF2, VALAC, VALP, VALPR, DS6, CRBAM, CRBAMP, CARB2, XCRBAM  No results found for: LITHM   RADIOLOGY REPORTS:(reviewed/updated 7/23/2017)  No results found.        MEDICATIONS     ALL MEDICATIONS:   Current Facility-Administered Medications   Medication Dose Route Frequency    OLANZapine (ZyPREXA) tablet 5 mg  5 mg Oral QHS    ziprasidone (GEODON) 20 mg in sterile water (preservative free) 1 mL injection  20 mg IntraMUSCular BID PRN    OLANZapine (ZyPREXA) tablet 5 mg  5 mg Oral Q6H PRN    benztropine (COGENTIN) tablet 2 mg  2 mg Oral BID PRN    benztropine (COGENTIN) injection 2 mg  2 mg IntraMUSCular BID PRN    LORazepam (ATIVAN) injection 2 mg  2 mg IntraMUSCular Q4H PRN    LORazepam (ATIVAN) tablet 1 mg  1 mg Oral Q4H PRN    zolpidem (AMBIEN) tablet 10 mg  10 mg Oral QHS PRN    acetaminophen (TYLENOL) tablet 650 mg  650 mg Oral Q4H PRN    ibuprofen (MOTRIN) tablet 400 mg  400 mg Oral Q8H PRN    magnesium hydroxide (MILK OF MAGNESIA) 400 mg/5 mL oral suspension 30 mL  30 mL Oral DAILY PRN    nicotine (NICODERM CQ) 21 mg/24 hr patch 1 Patch  1 Patch TransDERmal DAILY PRN      SCHEDULED MEDICATIONS:   Current Facility-Administered Medications   Medication Dose Route Frequency    OLANZapine (ZyPREXA) tablet 5 mg  5 mg Oral QHS          ASSESSMENT & PLAN     DIAGNOSES REQUIRING ACTIVE TREATMENT AND MONITORING: (reviewed/updated 7/23/2017)  Patient Active Hospital Problem List:   Schizoaffective disorder, chronic condition with acute exacerbation ([facility] [address] (7/22/2017)    Schizoaffective disorder, chronic condition with acute exacerbation ([facility] (7/22/2017)    Assessment: Acute exacerbation of manic and psychotic symptoms,loos association,irritable ,hyperverbal,flight of ideas,intrusive,responding to internal stimuli,labile    Plan: will treat with antipsychotic medications ,patient agrees to accept Zyprexa, use ativan prn for agitations and anxiety,monotir behavior,may consider mood stabilizer but patient does not want any medications ,provide supportive therapy.  Collateral information   Noncompliance with treatment (7/22/2017)    Assessment: Chronic problem due to poor insight      Plan: Educate about the need for medications ,consider Long Acting Antipsychotic med.      In summary, Karena Carlisle, is a 30 y.o.  female who presents with a severe exacerbation of the principal diagnosis of Schizoaffective disorder, chronic condition with acute exacerbation ([facility] [address]  Patient's condition is not stable . Patient requires continued inpatient hospitalization for further stabilization, safety monitoring and medication management. I will continue to coordinate the provision of individual, milieu, occupational, group, and substance abuse therapies to address target symptoms/diagnoses as deemed appropriate for the individual patient. A coordinated, multidisplinary treatment team round was conducted with the patient (this team consists of the nurse, psychiatric unit pharmcist,  and writer). Complete current electronic health record for patient has been reviewed today including consultant notes, ancillary staff notes, nurses and psychiatric tech notes. Suicide risk assessment completed and patient deemed to be of low risk for suicide at this time. The following regarding medications was addressed during rounds with patient:   the risks and benefits of the proposed medication. The patient was given the opportunity to ask questions. Informed consent given to the use of the above medications. Will continue to adjust psychiatric and non-psychiatric medications (see above \"medication\" section and orders section for details) as deemed appropriate & based upon diagnoses and response to treatment. I will continue to order blood tests/labs and diagnostic tests as deemed appropriate and review results as they become available (see orders for details and above listed lab/test results). I will order psychiatric records from previous [facility] hospitals to further elucidate the nature of patient's psychopathology and review once available.     I will gather additional collateral information from friends, family and o/p treatment team to further elucidate the nature of patient's psychopathology and baselline level of psychiatric functioning. I certify that this patient's inpatient psychiatric hospital services furnished since the previous certification were, and continue to be, required for treatment that could reasonably be expected to improve the patient's condition, or for diagnostic study, and that the patient continues to need, on a daily basis, active treatment furnished directly by or requiring the supervision of inpatient psychiatric facility personnel. In addition, the hospital records show that services furnished were intensive treatment services, admission or related services, or equivalent services.     EXPECTED DISCHARGE DATE/DAY: TBD     DISPOSITION:    Home       Signed By:   Lauri Rawls MD  7/23/2017

## 2017-07-24 NOTE — PROGRESS NOTES
Laboratory monitoring for mood stabilizer and antipsychotics:    Recommended baseline monitoring has been completed based on this patient's current medication regimen. The following labs were completed at transferring facility: WBC 13.49, Hgb 13.6, Hct 39.5, Plts 337, sodium 138, potassium 3.4, BUN 12, creatinine 0.98, glucose 110, calcium 9.5, AST 19, ALT 17, alk phos 53, total bili 0.6, albumin 4.2, UDS (-), HCG (-)     The patient is currently taking the following medication(s):   Current Facility-Administered Medications   Medication Dose Route Frequency    OLANZapine (ZyPREXA) tablet 5 mg  5 mg Oral QHS     Height, Weight, BMI Estimation  Estimated body mass index is 26.57 kg/(m^2) as calculated from the following:    Height as of this encounter: 160 cm (63\"). Weight as of this encounter: 68 kg (150 lb).      Renal Function, Hepatic Function and Chemistry  See above    Hematology  See above    Lipids  Lab Results   Component Value Date/Time    Cholesterol, total 132 07/22/2017 05:00 AM    HDL Cholesterol 65 07/22/2017 05:00 AM    LDL, calculated 52.2 07/22/2017 05:00 AM    Triglyceride 74 07/22/2017 05:00 AM    CHOL/HDL Ratio 2.0 07/22/2017 05:00 AM       Thyroid Function    Lab Results   Component Value Date/Time    TSH 3.26 07/22/2017 05:00 AM     Vitals  Visit Vitals    /86    Pulse 71    Temp 98.3 °F (36.8 °C)    Resp 18    Ht 160 cm (63\")    Wt 68 kg (150 lb)    Breastfeeding No    BMI 26.57 kg/m2       Pregnancy Test  See above    Cora CalderonD, BCPS  [phone] (pharmacy)

## 2017-07-24 NOTE — BH NOTES
SOCIAL WORK NOTE:  Pt did not attend processing group.          LEO Gomez, Supervisee in Social Work

## 2017-07-24 NOTE — BH NOTES
GROUP THERAPY PROGRESS NOTE    The patient Neris hartman 30 y.o. female is participating in Reflections Group    Group time: 30 minutes    Personal goal for participation: To discuss the daily goals    Goal orientation: personal    Group therapy participation: minimal    Therapeutic interventions reviewed and discussed:  Yes    Impression of participation: iris Parry  7/23/2017  10:09 PM

## 2017-07-24 NOTE — PROGRESS NOTES
Problem: Altered Thought Process (Adult/Pediatric)  Goal: *STG: Decreased delusional thinking  Outcome: Progressing Towards Goal  Pt is alert. She is compliant with medications. Pt is intrusive with staff and demanding at times. She has poor insight. Pt is hyper-verbal and repetitive. Pt denies  A/V hallucinations and denies S/H ideations. Will continue to monitor pt q15 minutes for safety and support.

## 2017-07-24 NOTE — BH NOTES
GROUP THERAPY PROGRESS NOTE    Ash Cuello is participating in [employer].      Group time: 30 minutes    Personal goal for participation: daily orientation    Goal orientation: personal    Group therapy participation: minimal    Therapeutic interventions reviewed and discussed: yes    Impression of participation: ok

## 2017-07-25 PROCEDURE — 65220000003 HC RM SEMIPRIVATE PSYCH

## 2017-07-25 PROCEDURE — 74011250637 HC RX REV CODE- 250/637: Performed by: PSYCHIATRY & NEUROLOGY

## 2017-07-25 RX ORDER — DIVALPROEX SODIUM 500 MG/1
500 TABLET, EXTENDED RELEASE ORAL
Status: DISCONTINUED | OUTPATIENT
Start: 2017-07-25 | End: 2017-07-27

## 2017-07-25 RX ORDER — CLONAZEPAM 1 MG/1
1 TABLET ORAL 2 TIMES DAILY
Status: DISCONTINUED | OUTPATIENT
Start: 2017-07-25 | End: 2017-08-07

## 2017-07-25 RX ADMIN — CLONAZEPAM 1 MG: 1 TABLET ORAL at 17:28

## 2017-07-25 RX ADMIN — OLANZAPINE 15 MG: 10 TABLET, FILM COATED ORAL at 21:22

## 2017-07-25 RX ADMIN — DIVALPROEX SODIUM 500 MG: 500 TABLET, FILM COATED, EXTENDED RELEASE ORAL at 21:22

## 2017-07-25 RX ADMIN — LORAZEPAM 1 MG: 1 TABLET ORAL at 10:00

## 2017-07-25 NOTE — PROGRESS NOTES
Problem: Altered Thought Process (Adult/Pediatric)  Goal: *STG: Remains safe in hospital  Outcome: Progressing Towards Goal  Pt has been visible on the unit. Easily irritated. Intrusive ad hyperverbal. Needs redirection for loud behavior and complies with redirection. Med/meal compliant. Received ativan 1 mg PO. Denies SI/HI and A/V hallucinations. Will continue to monitor with q 15 min rounds.

## 2017-07-25 NOTE — BH NOTES
GROUP THERAPY PROGRESS NOTE    Blaise Polo is participating in [place].      Group time: 30 minutes    Personal goal for participation: daily orientation    Goal orientation: personal    Group therapy participation: minimal    Therapeutic interventions reviewed and discussed: yes    Impression of participation: Attend    Vidhya Meek  7/25/2017

## 2017-07-25 NOTE — BH NOTES
GROUP THERAPY PROGRESS NOTE    The patient Brandie hartman 30 y.o. female is participating in Reflections Group    Group time: 30 minutes    Personal goal for participation: To discuss the daily goals    Goal orientation: personal    Group therapy participation: active    Therapeutic interventions reviewed and discussed:  Yes    Impression of participation: iris Rangel  7/24/2017  10:17 PM

## 2017-07-25 NOTE — BH NOTES
PSYCHIATRIC PROGRESS NOTE         Patient Name  Marcin Westbrook   Date of Birth 1980   [facility] [id number]   Medical Record Number  [id number]      Age  30 y.o. PCP None   Admit date:  7/21/2017    Room Number  315/02  @ [facility]   Date of Service  7/25/2017          PSYCHOTHERAPY SESSION NOTE:  Length of psychotherapy session: 15 minutes    Main condition/diagnosis/issues treated during session today, 7/25/2017 : compliance coping skills. I employed Cognitive Behavioral therapy techniques, Reality-Oriented psychotherapy, as well as supportive psychotherapy in regards to various ongoing psychosocial stressors, including the following: pre-admission and current problems; housing issues;occupational issues; academic issues; legal issues; medical issues; and stress of hospitalization. Interpersonal relationship issues and psychodynamic conflicts explored. Attempts made to alleviate maladaptive patterns. We, also, worked on issues of denial & effects of substance dependency/use     Overall, patient is not progressing    Treatment Plan Update (reviewed an updated 7/25/2017) : I will modify psychotherapy tx plan by implementing more stress management strategies, building upon cognitive behavioral techniques, increasing coping skills, as well as shoring up psychological defenses). An extended energy and skill set was needed to engage pt in psychotherapy due to some of the following: resistiveness, complexity, negativity, confrontational nature, hostile behaviors, and/or severe abnormalities in thought processes/psychosis resulting in the loss of expressive/receptive language communication skills. E & M PROGRESS NOTE:         HISTORY         HISTORY OF PRESENT ILLNESS/INTERVAL HISTORY:  (reviewed/updated 7/25/2017). CC: \"I passed out our food in the refrigerator was bad  \".  Pt admitted under a [facility] order (TDO)   for severe psychosis and wendy proving to be/posing an imminent danger to selfand an inability to care for self. HISTORY OF PRESENT ILLNESS:    The patient, Teodora Moss, is a 30 y.o. WHITE [place]  female with a past psychiatric history significant for Schizoaffective disorder bipolar type , who presents at this time with complaints of (and/or evidence of) the following emotional symptoms: agitation, psychotic behavior, wendy and psychosis. Additional symptomatology include agitation, difficulty sleeping and increased irritability,she was found wandering around ,confuse,disoritented,unkempt , not wearing shoes or undergarments. The above symptoms have been present for one day . These symptoms are of moderate in  severity. These symptoms are constant  in nature. The patient's condition has been precipitated by and psychosocial stressors . treatment noncompliance. UDS: negative; BAL=0. Patient reported that she has been diagnosed with Schizoaffective disorder, since last 2 years she is not taking any medications and not following any psychiatrist. She reported that she has been tried on Tegretol,depakote, lithium ,haldol ,Risperidone and Zyprexa. She only agree to take [address] presents/reports/evidences the following emotional symptoms today, 7/25/2017:agitation, delusions and wendy. The above symptoms have been present for one day . These symptoms are of moderate in severity. The symptoms are intermittent/ fleeting in nature. Additional symptomatology and features include labile mood, agitation, poor insight. Hyper verbal and delusional themes. Limited insight and wants only Zyprexa. Tangential, flat affect, irritable, threatening to stop taking her med if they are changed. Receiving prn med for anxiety. SIDE EFFECTS: (reviewed/updated 7/25/2017)  None reported or admitted to.      ALLERGIES:(reviewed/updated 7/25/2017)  No Known Allergies   MEDICATIONS PRIOR TO ADMISSION:(reviewed/updated 7/25/2017)  Prescriptions Prior to Admission   Medication Sig    carBAMazepine XR (TEGRETOL XR) 100 mg SR tablet Take 100 mg by mouth two (2) times a day. Indications: Ana associated with Bipolar Disorder, MIXED BIPOLAR I DISORDER      PAST MEDICAL HISTORY: Past medical history from the initial psychiatric evaluation has been reviewed (reviewed/updated 7/25/2017) with no additional updates (I asked patient and no additional past medical history provided). Past Medical History:   Diagnosis Date    Anxiety disorder     Depression     Hypertension     Psychotic disorder      Past Surgical History:   Procedure Laterality Date    HX GYN        SOCIAL HISTORY: Social history from the initial psychiatric evaluation has been reviewed (reviewed/updated 7/25/2017) with no additional updates (I asked patient and no additional social history provided). Social History     Social History    Marital status: SINGLE     Spouse name: N/A    Number of children: N/A    Years of education: N/A     Occupational History    Not on file. Social History Main Topics    Smoking status: Never Smoker    Smokeless tobacco: Never Used    Alcohol use No    Drug use: No    Sexual activity: Yes     Other Topics Concern    Not on file     Social History Narrative    No narrative on file      FAMILY HISTORY: Family history from the initial psychiatric evaluation has been reviewed (reviewed/updated 7/25/2017) with no additional updates (I asked patient and no additional family history provided). History reviewed. No pertinent family history.     REVIEW OF SYSTEMS: (reviewed/updated 7/25/2017)  Appetite:improved   Sleep: improved   All other Review of Systems: Psychological ROS: positive for - hostility, irritability and mood swings  Respiratory ROS: no cough, shortness of breath, or wheezing  Cardiovascular ROS: no chest pain or dyspnea on exertion         [facility] STATUS EXAM (MSE):    MSE FINDINGS ARE WITHIN NORMAL LIMITS (WNL) UNLESS OTHERWISE STATED BELOW. ( ALL OF THE BELOW CATEGORIES OF THE MSE HAVE BEEN REVIEWED (reviewed 7/25/2017) AND UPDATED AS DEEMED APPROPRIATE )  General Presentation age appropriate and casually dressed, uncooperative and unreliable   Orientation oriented to time, place and person   Vital Signs  See below (reviewed 7/25/2017); Vital Signs (BP, Pulse, & Temp) are within normal limits if not listed below. Gait and Station Stable/steady, no ataxia   Musculoskeletal System No extrapyramidal symptoms (EPS); no abnormal muscular movements or Tardive Dyskinesia (TD); muscle strength and tone are within normal limits   Language No aphasia or dysarthria   Speech:  hyperverbal   Thought Processes illogical; fast rate of thoughts; poor abstract reasoning/computation   Thought Associations tangential   Thought Content Paranoid delusion, free of hallucination   Suicidal Ideations no plan  and no intention   Homicidal Ideations none   Mood:  anxious , irritable and labile ,   Affect:  blunted, hostile, irritable and labile   Memory recent  intact   Memory remote:  intact   Concentration/Attention:  intact   Fund of Knowledge average   Insight:  limited   Reliability poor   Judgment:  poor          VITALS:     Patient Vitals for the past 24 hrs:   Temp Pulse Resp BP   07/25/17 0638 - (!) 53 16 101/71     Wt Readings from Last 3 Encounters:   07/21/17 68 kg (150 lb)     Temp Readings from Last 3 Encounters:   No data found for Temp     BP Readings from Last 3 Encounters:   07/25/17 101/71     Pulse Readings from Last 3 Encounters:   07/25/17 (!) 53            DATA     LABORATORY DATA:(reviewed/updated 7/25/2017)  No results found for this or any previous visit (from the past 24 hour(s)).   No results found for: VALF2, VALAC, VALP, VALPR, DS6, CRBAM, CRBAMP, CARB2, XCRBAM  No results found for: LITHM   RADIOLOGY REPORTS:(reviewed/updated 7/25/2017)  No results found.       MEDICATIONS     ALL MEDICATIONS:   Current Facility-Administered Medications   Medication Dose Route Frequency    OLANZapine (ZyPREXA) tablet 15 mg  15 mg Oral QHS    divalproex ER (DEPAKOTE ER) 24 hour tablet 500 mg  500 mg Oral QHS    clonazePAM (KlonoPIN) tablet 1 mg  1 mg Oral BID    ziprasidone (GEODON) 20 mg in sterile water (preservative free) 1 mL injection  20 mg IntraMUSCular BID PRN    OLANZapine (ZyPREXA) tablet 5 mg  5 mg Oral Q6H PRN    benztropine (COGENTIN) tablet 2 mg  2 mg Oral BID PRN    benztropine (COGENTIN) injection 2 mg  2 mg IntraMUSCular BID PRN    LORazepam (ATIVAN) injection 2 mg  2 mg IntraMUSCular Q4H PRN    LORazepam (ATIVAN) tablet 1 mg  1 mg Oral Q4H PRN    zolpidem (AMBIEN) tablet 10 mg  10 mg Oral QHS PRN    acetaminophen (TYLENOL) tablet 650 mg  650 mg Oral Q4H PRN    ibuprofen (MOTRIN) tablet 400 mg  400 mg Oral Q8H PRN    magnesium hydroxide (MILK OF MAGNESIA) 400 mg/5 mL oral suspension 30 mL  30 mL Oral DAILY PRN    nicotine (NICODERM CQ) 21 mg/24 hr patch 1 Patch  1 Patch TransDERmal DAILY PRN      SCHEDULED MEDICATIONS:   Current Facility-Administered Medications   Medication Dose Route Frequency    OLANZapine (ZyPREXA) tablet 15 mg  15 mg Oral QHS    divalproex ER (DEPAKOTE ER) 24 hour tablet 500 mg  500 mg Oral QHS    clonazePAM (KlonoPIN) tablet 1 mg  1 mg Oral BID          ASSESSMENT & PLAN     DIAGNOSES REQUIRING ACTIVE TREATMENT AND MONITORING: (reviewed/updated 7/25/2017)  Patient Active Hospital Problem List:   Schizoaffective disorder, chronic condition with acute exacerbation ([facility] [address] (7/22/2017)      Assessment: minimal change-remains hyperverbal,flight of ideas,intrusive,responding to internal stimuli,labile    Plan: I will ct to adjust med- Zyprexa, add Depakote, klonopin added- depot if compliance is an issue     Noncompliance with treatment (7/22/2017)    Assessment: Chronic problem due to poor insight      Plan: Educate about the need for medications ,consider Long Acting Antipsychotic med.      In summary, Brandie Ordonez, is a 30 y.o.  female who presents with a severe exacerbation of the principal diagnosis of Schizoaffective disorder, chronic condition with acute exacerbation ([facility] [address]  Patient's condition is not stable . Patient requires continued inpatient hospitalization for further stabilization, safety monitoring and medication management. I will continue to coordinate the provision of individual, milieu, occupational, group, and substance abuse therapies to address target symptoms/diagnoses as deemed appropriate for the individual patient. A coordinated, multidisplinary treatment team round was conducted with the patient (this team consists of the nurse, psychiatric unit pharmcist,  and writer). Complete current electronic health record for patient has been reviewed today including consultant notes, ancillary staff notes, nurses and psychiatric tech notes. Suicide risk assessment completed and patient deemed to be of low risk for suicide at this time. The following regarding medications was addressed during rounds with patient:   the risks and benefits of the proposed medication. The patient was given the opportunity to ask questions. Informed consent given to the use of the above medications. Will continue to adjust psychiatric and non-psychiatric medications (see above \"medication\" section and orders section for details) as deemed appropriate & based upon diagnoses and response to treatment. I will continue to order blood tests/labs and diagnostic tests as deemed appropriate and review results as they become available (see orders for details and above listed lab/test results). I will order psychiatric records from previous [facility] hospitals to further elucidate the nature of patient's psychopathology and review once available.     I will gather additional collateral information from friends, family and o/p treatment team to further elucidate the nature of patient's psychopathology and baselline level of psychiatric functioning. I certify that this patient's inpatient psychiatric hospital services furnished since the previous certification were, and continue to be, required for treatment that could reasonably be expected to improve the patient's condition, or for diagnostic study, and that the patient continues to need, on a daily basis, active treatment furnished directly by or requiring the supervision of inpatient psychiatric facility personnel. In addition, the hospital records show that services furnished were intensive treatment services, admission or related services, or equivalent services.     EXPECTED DISCHARGE DATE/DAY: TBD     DISPOSITION:    Home       Signed By:   Hunter Torres MD  7/25/2017

## 2017-07-25 NOTE — BH NOTES
Pt loud in the halls,pacing,and intrusive with peers. Ativan 1mg given PO. Will monitor medication effectiveness and assess needs. 11:30am-Medication not effective as evidenced by pt being loud,disruptive and cursing in the halls.

## 2017-07-25 NOTE — PROGRESS NOTES
Problem: Altered Thought Process (Adult/Pediatric)  Goal: *STG: Remains safe in hospital  Outcome: Progressing Towards Goal  Pt remains irritable and labile. Pt exhibits manic behavior. Very argumentative. Able to take redirection. Med and meal compliant; but becomes hyperverbal when questioning the reasoning behind the medications. Monopolizes the phones. Poor insight. Makes inapropriate comments. Has a court ordered med sheet. Denies SI/HI and A/V hallucinations. Will continue to monitor with q15 rounds for safety.

## 2017-07-25 NOTE — PROGRESS NOTES
Problem: Altered Thought Process (Adult/Pediatric)  Goal: *STG: Remains safe in hospital  Outcome: Progressing Towards Goal  Pt alert. Mood labile. Loud and irritable. Pt became upset during treatment team when the Dr discussed her treatment plan. Hyperverbal.Manic. No insight. Will continue to monitor pt and behavior q15 minutes and anticipate needs.

## 2017-07-25 NOTE — BH NOTES
SOCIAL WORK NOTE:  Pt did not attend processing group. She entered group in an agitated manner while cursing about her perceptions of being mistreated. Pt attempted to \"take over\" group and this writer set firm boundaries regarding group expectations. She then excused herself form group and left the day room.        LEO Ewing, Supervisee in Social Work

## 2017-07-25 NOTE — BH NOTES
Pt received PRN ativan PO 1 mg at 1640 for becoming hyperverbal, disturbing the therapeutic milieu, and exhibiting signs of anxiety.

## 2017-07-25 NOTE — BH NOTES
PSYCHIATRIC PROGRESS NOTE         Patient Name  Mago Yung   Date of Birth 1980   [facility] [id number]   Medical Record Number  [id number]      Age  30 y.o. PCP None   Admit date:  7/21/2017    Room Number  315/02  @ [facility]   Date of Service  7/24/2017          PSYCHOTHERAPY SESSION NOTE:  Length of psychotherapy session: 15 minutes    Main condition/diagnosis/issues treated during session today, 7/24/2017 : compliance coping skills. I employed Cognitive Behavioral therapy techniques, Reality-Oriented psychotherapy, as well as supportive psychotherapy in regards to various ongoing psychosocial stressors, including the following: pre-admission and current problems; housing issues;occupational issues; academic issues; legal issues; medical issues; and stress of hospitalization. Interpersonal relationship issues and psychodynamic conflicts explored. Attempts made to alleviate maladaptive patterns. We, also, worked on issues of denial & effects of substance dependency/use     Overall, patient is not progressing    Treatment Plan Update (reviewed an updated 7/24/2017) : I will modify psychotherapy tx plan by implementing more stress management strategies, building upon cognitive behavioral techniques, increasing coping skills, as well as shoring up psychological defenses). An extended energy and skill set was needed to engage pt in psychotherapy due to some of the following: resistiveness, complexity, negativity, confrontational nature, hostile behaviors, and/or severe abnormalities in thought processes/psychosis resulting in the loss of expressive/receptive language communication skills. E & M PROGRESS NOTE:         HISTORY         HISTORY OF PRESENT ILLNESS/INTERVAL HISTORY:  (reviewed/updated 7/24/2017). CC: \"I passed out our food in the refrigerator was bad  \".  Pt admitted under a [facility] correction order (TDO)   for severe psychosis and wendy proving to be/posing an imminent danger to selfand an inability to care for self. HISTORY OF PRESENT ILLNESS:    The patient, Ash Cuello, is a 30 y.o. WHITE [place]  female with a past psychiatric history significant for Schizoaffective disorder bipolar type , who presents at this time with complaints of (and/or evidence of) the following emotional symptoms: agitation, psychotic behavior, wendy and psychosis. Additional symptomatology include agitation, difficulty sleeping and increased irritability,she was found wandering around ,confuse,disoritented,unkempt , not wearing shoes or undergarments. The above symptoms have been present for one day . These symptoms are of moderate in  severity. These symptoms are constant  in nature. The patient's condition has been precipitated by and psychosocial stressors . treatment noncompliance. UDS: negative; BAL=0. Patient reported that she has been diagnosed with Schizoaffective disorder, since last 2 years she is not taking any medications and not following any psychiatrist. She reported that she has been tried on Tegretol,depakote, lithium ,haldol ,Risperidone and Zyprexa. She only agree to take [address] presents/reports/evidences the following emotional symptoms today, 7/24/2017:agitation, delusions and wendy. The above symptoms have been present for one day . These symptoms are of moderate in severity. The symptoms are intermittent/ fleeting in nature. Additional symptomatology and features include labile mood, agitation, poor insight. Hyper verbal and delusional themes. Limited insight and wants only Zyprexa. tangential      SIDE EFFECTS: (reviewed/updated 7/24/2017)  None reported or admitted to.      ALLERGIES:(reviewed/updated 7/24/2017)  No Known Allergies   MEDICATIONS PRIOR TO ADMISSION:(reviewed/updated 7/24/2017)  Prescriptions Prior to Admission   Medication Sig    carBAMazepine XR (TEGRETOL XR) 100 mg SR tablet Take 100 mg by mouth two (2) times a day. Indications: Ana associated with Bipolar Disorder, MIXED BIPOLAR I DISORDER      PAST MEDICAL HISTORY: Past medical history from the initial psychiatric evaluation has been reviewed (reviewed/updated 7/24/2017) with no additional updates (I asked patient and no additional past medical history provided). Past Medical History:   Diagnosis Date    Anxiety disorder     Depression     Hypertension     Psychotic disorder      Past Surgical History:   Procedure Laterality Date    HX GYN        SOCIAL HISTORY: Social history from the initial psychiatric evaluation has been reviewed (reviewed/updated 7/24/2017) with no additional updates (I asked patient and no additional social history provided). Social History     Social History    Marital status: SINGLE     Spouse name: N/A    Number of children: N/A    Years of education: N/A     Occupational History    Not on file. Social History Main Topics    Smoking status: Never Smoker    Smokeless tobacco: Never Used    Alcohol use No    Drug use: No    Sexual activity: Yes     Other Topics Concern    Not on file     Social History Narrative    No narrative on file      FAMILY HISTORY: Family history from the initial psychiatric evaluation has been reviewed (reviewed/updated 7/24/2017) with no additional updates (I asked patient and no additional family history provided). History reviewed. No pertinent family history.     REVIEW OF SYSTEMS: (reviewed/updated 7/24/2017)  Appetite:improved   Sleep: improved   All other Review of Systems: Psychological ROS: positive for - hostility, irritability and mood swings  Respiratory ROS: no cough, shortness of breath, or wheezing  Cardiovascular ROS: no chest pain or dyspnea on exertion         [facility] ([facility]):    MSE FINDINGS ARE WITHIN NORMAL LIMITS (WNL) UNLESS OTHERWISE STATED BELOW. ( ALL OF THE BELOW CATEGORIES OF THE MSE HAVE BEEN REVIEWED (reviewed 7/24/2017) AND UPDATED AS DEEMED APPROPRIATE )  General Presentation age appropriate and casually dressed, uncooperative and unreliable   Orientation oriented to time, place and person   Vital Signs  See below (reviewed 7/24/2017); Vital Signs (BP, Pulse, & Temp) are within normal limits if not listed below. Gait and Station Stable/steady, no ataxia   Musculoskeletal System No extrapyramidal symptoms (EPS); no abnormal muscular movements or Tardive Dyskinesia (TD); muscle strength and tone are within normal limits   Language No aphasia or dysarthria   Speech:  hyperverbal   Thought Processes illogical; fast rate of thoughts; poor abstract reasoning/computation   Thought Associations tangential   Thought Content Paranoid delusion, free of hallucination   Suicidal Ideations no plan  and no intention   Homicidal Ideations none   Mood:  anxious , irritable and labile    Affect:  blunted, hostile, irritable and labile   Memory recent  intact   Memory remote:  intact   Concentration/Attention:  intact   Fund of Knowledge average   Insight:  limited   Reliability poor   Judgment:  poor          VITALS:     Patient Vitals for the past 24 hrs:   Pulse Resp BP   07/24/17 0600 71 18 125/86     Wt Readings from Last 3 Encounters:   07/21/17 68 kg (150 lb)     Temp Readings from Last 3 Encounters:   07/23/17 98.3 °F (36.8 °C)     BP Readings from Last 3 Encounters:   07/24/17 125/86     Pulse Readings from Last 3 Encounters:   07/24/17 71            DATA     LABORATORY DATA:(reviewed/updated 7/24/2017)  No results found for this or any previous visit (from the past 24 hour(s)). No results found for: VALF2, VALAC, VALP, VALPR, DS6, CRBAM, CRBAMP, CARB2, XCRBAM  No results found for: LITHM   RADIOLOGY REPORTS:(reviewed/updated 7/24/2017)  No results found.        MEDICATIONS     ALL MEDICATIONS:   Current Facility-Administered Medications   Medication Dose Route Frequency    OLANZapine (ZyPREXA) tablet 10 mg  10 mg Oral QHS    ziprasidone (GEODON) 20 mg in sterile water (preservative free) 1 mL injection  20 mg IntraMUSCular BID PRN    OLANZapine (ZyPREXA) tablet 5 mg  5 mg Oral Q6H PRN    benztropine (COGENTIN) tablet 2 mg  2 mg Oral BID PRN    benztropine (COGENTIN) injection 2 mg  2 mg IntraMUSCular BID PRN    LORazepam (ATIVAN) injection 2 mg  2 mg IntraMUSCular Q4H PRN    LORazepam (ATIVAN) tablet 1 mg  1 mg Oral Q4H PRN    zolpidem (AMBIEN) tablet 10 mg  10 mg Oral QHS PRN    acetaminophen (TYLENOL) tablet 650 mg  650 mg Oral Q4H PRN    ibuprofen (MOTRIN) tablet 400 mg  400 mg Oral Q8H PRN    magnesium hydroxide (MILK OF MAGNESIA) 400 mg/5 mL oral suspension 30 mL  30 mL Oral DAILY PRN    nicotine (NICODERM CQ) 21 mg/24 hr patch 1 Patch  1 Patch TransDERmal DAILY PRN      SCHEDULED MEDICATIONS:   Current Facility-Administered Medications   Medication Dose Route Frequency    OLANZapine (ZyPREXA) tablet 10 mg  10 mg Oral QHS          ASSESSMENT & PLAN     DIAGNOSES REQUIRING ACTIVE TREATMENT AND MONITORING: (reviewed/updated 7/24/2017)  Patient Active Hospital Problem List:   Schizoaffective disorder, chronic condition with acute exacerbation ([facility] [address] (7/22/2017)      Assessment: acute-hyperverbal,flight of ideas,intrusive,responding to internal stimuli,labile    Plan: I will ct to adjust med- Zyprexa, consider mood stabilizer, depot if compliance is an issue     Noncompliance with treatment (7/22/2017)    Assessment: Chronic problem due to poor insight      Plan: Educate about the need for medications ,consider Long Acting Antipsychotic med.      In summary, Alyson Brothers, is a 30 y.o.  female who presents with a severe exacerbation of the principal diagnosis of Schizoaffective disorder, chronic condition with acute exacerbation ([facility] [address]  Patient's condition is not stable .   Patient requires continued inpatient hospitalization for further stabilization, safety monitoring and medication management. I will continue to coordinate the provision of individual, milieu, occupational, group, and substance abuse therapies to address target symptoms/diagnoses as deemed appropriate for the individual patient. A coordinated, multidisplinary treatment team round was conducted with the patient (this team consists of the nurse, psychiatric unit pharmcist,  and writer). Complete current electronic health record for patient has been reviewed today including consultant notes, ancillary staff notes, nurses and psychiatric tech notes. Suicide risk assessment completed and patient deemed to be of low risk for suicide at this time. The following regarding medications was addressed during rounds with patient:   the risks and benefits of the proposed medication. The patient was given the opportunity to ask questions. Informed consent given to the use of the above medications. Will continue to adjust psychiatric and non-psychiatric medications (see above \"medication\" section and orders section for details) as deemed appropriate & based upon diagnoses and response to treatment. I will continue to order blood tests/labs and diagnostic tests as deemed appropriate and review results as they become available (see orders for details and above listed lab/test results). I will order psychiatric records from previous [facility] hospitals to further elucidate the nature of patient's psychopathology and review once available. I will gather additional collateral information from friends, family and o/p treatment team to further elucidate the nature of patient's psychopathology and baselline level of psychiatric functioning.          I certify that this patient's inpatient psychiatric hospital services furnished since the previous certification were, and continue to be, required for treatment that could reasonably be expected to improve the patient's condition, or for diagnostic study, and that the patient continues to need, on a daily basis, active treatment furnished directly by or requiring the supervision of inpatient psychiatric facility personnel. In addition, the hospital records show that services furnished were intensive treatment services, admission or related services, or equivalent services.     EXPECTED DISCHARGE DATE/DAY: TBD     DISPOSITION:    Home       Signed By:   Radha Regalado MD  7/24/2017

## 2017-07-26 PROCEDURE — 65220000003 HC RM SEMIPRIVATE PSYCH

## 2017-07-26 PROCEDURE — 74011250637 HC RX REV CODE- 250/637: Performed by: PSYCHIATRY & NEUROLOGY

## 2017-07-26 PROCEDURE — 74011250636 HC RX REV CODE- 250/636: Performed by: PSYCHIATRY & NEUROLOGY

## 2017-07-26 RX ORDER — OLANZAPINE 10 MG/1
20 TABLET ORAL
Status: DISCONTINUED | OUTPATIENT
Start: 2017-07-26 | End: 2017-08-02

## 2017-07-26 RX ADMIN — LORAZEPAM 2 MG: 2 INJECTION INTRAMUSCULAR; INTRAVENOUS at 12:47

## 2017-07-26 RX ADMIN — CLONAZEPAM 1 MG: 1 TABLET ORAL at 17:29

## 2017-07-26 RX ADMIN — CLONAZEPAM 1 MG: 1 TABLET ORAL at 09:13

## 2017-07-26 RX ADMIN — DIVALPROEX SODIUM 500 MG: 500 TABLET, FILM COATED, EXTENDED RELEASE ORAL at 21:29

## 2017-07-26 RX ADMIN — OLANZAPINE 20 MG: 10 TABLET, FILM COATED ORAL at 21:29

## 2017-07-26 NOTE — PROGRESS NOTES
Problem: Altered Thought Process (Adult/Pediatric)  Goal: *STG: Participates in treatment plan  Outcome: Progressing Towards Goal  Pt is oriented to Person, Place, Time and Situation. Pt affect is labile and mood is labile. Speech is pressured and tangential. Pt  denies SI/HI. Pt denies A/V hallucinations. Pt is still manic, paranoid and disorganized. Pt compliant with all meds with promt,  Compliant with meals without any problem. Will continue to monitor pt for safety per hospital protocol. Will continue to encourage pt to participate in group therapy.

## 2017-07-26 NOTE — BH NOTES
Pt in the halls tearful,placed herself on the floor and refusing to get up. Disruptive. She was observed being loud and argumentative while talking on the phone to her boyfriend. Demanding to call him back. Unable to follow directions. Pt placed in open-seclusion for a time out. Ativan 2mg given IM in right glute. Will monitor 1:1 and anticipate needs.

## 2017-07-26 NOTE — BH NOTES
Patient became tearful loud. Yelling and screaming about a conversation she had with her boyfriend on the phone. Patient is dramatic. Patient was demanding to call him back and being very disruptive on the unit. Patient placed self on floor and refused to get up. After several times redirecting patient to get up off floor patient got up went in room can back out and started yelling and screaming again. Patient was not following redirections. Limited insight. Patient placed in open seclusion and given ativan 2mg IM. Will continue to monitor patient and assess needs.

## 2017-07-26 NOTE — BH NOTES
GROUP THERAPY PROGRESS NOTE    The patient Yvon hartman 30 y.o. female is participating in Coping Skills Group. Group time: 45 minutes    Personal goal for participation:  To identify people and things most grateful for    Goal orientation:  personal    Group therapy participation: minimal    Therapeutic interventions reviewed and discussed: worksheet    Impression of participation:  The patient was present-arrived erica Summers  7/26/2017  2:12 PM

## 2017-07-26 NOTE — BH NOTES
GROUP THERAPY PROGRESS NOTE    Sheldon Madison is participating in [place].      Group time: 30 minutes    Personal goal for participation: reality orientation    Goal orientation: social    Group therapy participation: minimal    Therapeutic interventions reviewed and discussed: yes    Impression of participation: no problem

## 2017-07-26 NOTE — PROGRESS NOTES
Problem: Altered Thought Process (Adult/Pediatric)  Goal: *STG: Remains safe in hospital  Outcome: Progressing Towards Goal  Pt has been visible on the unit. She is still very argumentative and easily frustrated. Still hyperverbal and requiring redirection for behaviors. She received Ativan IM on the previous shift. She hasn't been an issue or received any PRN's this evening. Pt denies SI/HI and A/V hallucinations. Will continue to monitor with q1 5 min rounds for safety.

## 2017-07-26 NOTE — BH NOTES
PSYCHIATRIC PROGRESS NOTE         Patient Name  Eva Blackman   Date of Birth 1980   [facility] [id number]   Medical Record Number  [id number]      Age  30 y.o. PCP None   Admit date:  7/21/2017    Room Number  315/02  @ [facility]   Date of Service  7/26/2017          PSYCHOTHERAPY SESSION NOTE:  Length of psychotherapy session: 15 minutes    Main condition/diagnosis/issues treated during session today, 7/26/2017 : compliance coping skills. I employed Cognitive Behavioral therapy techniques, Reality-Oriented psychotherapy, as well as supportive psychotherapy in regards to various ongoing psychosocial stressors, including the following: pre-admission and current problems; housing issues;occupational issues; academic issues; legal issues; medical issues; and stress of hospitalization. Interpersonal relationship issues and psychodynamic conflicts explored. Attempts made to alleviate maladaptive patterns. We, also, worked on issues of denial & effects of substance dependency/use     Overall, patient is not progressing    Treatment Plan Update (reviewed an updated 7/26/2017) : I will modify psychotherapy tx plan by implementing more stress management strategies, building upon cognitive behavioral techniques, increasing coping skills, as well as shoring up psychological defenses). An extended energy and skill set was needed to engage pt in psychotherapy due to some of the following: resistiveness, complexity, negativity, confrontational nature, hostile behaviors, and/or severe abnormalities in thought processes/psychosis resulting in the loss of expressive/receptive language communication skills. E & M PROGRESS NOTE:         HISTORY         HISTORY OF PRESENT ILLNESS/INTERVAL HISTORY:  (reviewed/updated 7/26/2017). CC: \"I passed out our food in the refrigerator was bad  \".  Pt admitted under a [facility] order (TDO)   for severe psychosis and wendy proving to be/posing an imminent danger to selfand an inability to care for self. HISTORY OF PRESENT ILLNESS:    The patient, Korin Vazquez, is a 30 y.o. WHITE [place]  female with a past psychiatric history significant for Schizoaffective disorder bipolar type , who presents at this time with complaints of (and/or evidence of) the following emotional symptoms: agitation, psychotic behavior, wendy and psychosis. Additional symptomatology include agitation, difficulty sleeping and increased irritability,she was found wandering around ,confuse,disoritented,unkempt , not wearing shoes or undergarments. The above symptoms have been present for one day . These symptoms are of moderate in  severity. These symptoms are constant  in nature. The patient's condition has been precipitated by and psychosocial stressors . treatment noncompliance. UDS: negative; BAL=0. Patient reported that she has been diagnosed with Schizoaffective disorder, since last 2 years she is not taking any medications and not following any psychiatrist. She reported that she has been tried on Tegretol,depakote, lithium ,haldol ,Risperidone and Zyprexa. She only agree to take [address] presents/reports/evidences the following emotional symptoms today, 7/26/2017:agitation, delusions and wendy. The above symptoms have been present for one day . These symptoms are of moderate in severity. The symptoms are intermittent/ fleeting in nature. Additional symptomatology and features include less labile. Remains hyper verbal, disorganized and delusional. Limited insight but accepting med. SIDE EFFECTS: (reviewed/updated 7/26/2017)  None reported or admitted to.      ALLERGIES:(reviewed/updated 7/26/2017)  No Known Allergies   MEDICATIONS PRIOR TO ADMISSION:(reviewed/updated 7/26/2017)  Prescriptions Prior to Admission   Medication Sig    carBAMazepine XR (TEGRETOL XR) 100 mg SR tablet Take 100 mg by mouth two (2) times a day. Indications: Ana associated with Bipolar Disorder, MIXED BIPOLAR I DISORDER      PAST MEDICAL HISTORY: Past medical history from the initial psychiatric evaluation has been reviewed (reviewed/updated 7/26/2017) with no additional updates (I asked patient and no additional past medical history provided). Past Medical History:   Diagnosis Date    Anxiety disorder     Depression     Hypertension     Psychotic disorder      Past Surgical History:   Procedure Laterality Date    HX GYN        SOCIAL HISTORY: Social history from the initial psychiatric evaluation has been reviewed (reviewed/updated 7/26/2017) with no additional updates (I asked patient and no additional social history provided). Social History     Social History    Marital status: SINGLE     Spouse name: N/A    Number of children: N/A    Years of education: N/A     Occupational History    Not on file. Social History Main Topics    Smoking status: Never Smoker    Smokeless tobacco: Never Used    Alcohol use No    Drug use: No    Sexual activity: Yes     Other Topics Concern    Not on file     Social History Narrative    No narrative on file      FAMILY HISTORY: Family history from the initial psychiatric evaluation has been reviewed (reviewed/updated 7/26/2017) with no additional updates (I asked patient and no additional family history provided). History reviewed. No pertinent family history.     REVIEW OF SYSTEMS: (reviewed/updated 7/26/2017)  Appetite:improved   Sleep: improved   All other Review of Systems: Psychological ROS: positive for - hostility, irritability and mood swings  Respiratory ROS: no cough, shortness of breath, or wheezing  Cardiovascular ROS: no chest pain or dyspnea on exertion         MENTAL STATUS EXAM & VITALS     MENTAL STATUS EXAM (MSE):    MSE FINDINGS ARE WITHIN NORMAL LIMITS (WNL) UNLESS OTHERWISE STATED BELOW. ( ALL OF THE BELOW CATEGORIES OF THE MSE HAVE BEEN REVIEWED (reviewed 7/26/2017) AND UPDATED AS DEEMED APPROPRIATE )  General Presentation age appropriate and casually dressed, co operative, unreliable   Orientation oriented to time, place and person   Vital Signs  See below (reviewed 7/26/2017); Vital Signs (BP, Pulse, & Temp) are within normal limits if not listed below. Gait and Station Stable/steady, no ataxia   Musculoskeletal System No extrapyramidal symptoms (EPS); no abnormal muscular movements or Tardive Dyskinesia (TD); muscle strength and tone are within normal limits   Language No aphasia or dysarthria   Speech:  hyperverbal   Thought Processes illogical; fast rate of thoughts; poor abstract reasoning/computation   Thought Associations tangential   Thought Content Paranoid delusion, free of hallucination   Suicidal Ideations no plan  and no intention   Homicidal Ideations none   Mood:  anxious , irritable and labile ,   Affect:  Labile, mood incongruent   Memory recent  intact   Memory remote:  intact   Concentration/Attention:  intact   Fund of Knowledge average   Insight:  limited   Reliability poor   Judgment:  poor          VITALS:     Patient Vitals for the past 24 hrs:   Temp Pulse Resp BP   07/26/17 0615 - 60 16 108/74   07/25/17 1200 - 89 18 (!) 132/92     Wt Readings from Last 3 Encounters:   07/21/17 68 kg (150 lb)     Temp Readings from Last 3 Encounters:   No data found for Temp     BP Readings from Last 3 Encounters:   07/26/17 108/74     Pulse Readings from Last 3 Encounters:   07/26/17 60            DATA     LABORATORY DATA:(reviewed/updated 7/26/2017)  No results found for this or any previous visit (from the past 24 hour(s)). No results found for: VALF2, VALAC, VALP, VALPR, DS6, CRBAM, CRBAMP, CARB2, XCRBAM  No results found for: LITHM   RADIOLOGY REPORTS:(reviewed/updated 7/26/2017)  No results found.        MEDICATIONS     ALL MEDICATIONS:   Current Facility-Administered Medications   Medication Dose Route Frequency    OLANZapine (ZyPREXA) tablet 20 mg  20 mg Oral QHS    divalproex ER (DEPAKOTE ER) 24 hour tablet 500 mg  500 mg Oral QHS    clonazePAM (KlonoPIN) tablet 1 mg  1 mg Oral BID    ziprasidone (GEODON) 20 mg in sterile water (preservative free) 1 mL injection  20 mg IntraMUSCular BID PRN    OLANZapine (ZyPREXA) tablet 5 mg  5 mg Oral Q6H PRN    benztropine (COGENTIN) tablet 2 mg  2 mg Oral BID PRN    benztropine (COGENTIN) injection 2 mg  2 mg IntraMUSCular BID PRN    LORazepam (ATIVAN) injection 2 mg  2 mg IntraMUSCular Q4H PRN    LORazepam (ATIVAN) tablet 1 mg  1 mg Oral Q4H PRN    zolpidem (AMBIEN) tablet 10 mg  10 mg Oral QHS PRN    acetaminophen (TYLENOL) tablet 650 mg  650 mg Oral Q4H PRN    ibuprofen (MOTRIN) tablet 400 mg  400 mg Oral Q8H PRN    magnesium hydroxide (MILK OF MAGNESIA) 400 mg/5 mL oral suspension 30 mL  30 mL Oral DAILY PRN    nicotine (NICODERM CQ) 21 mg/24 hr patch 1 Patch  1 Patch TransDERmal DAILY PRN      SCHEDULED MEDICATIONS:   Current Facility-Administered Medications   Medication Dose Route Frequency    OLANZapine (ZyPREXA) tablet 20 mg  20 mg Oral QHS    divalproex ER (DEPAKOTE ER) 24 hour tablet 500 mg  500 mg Oral QHS    clonazePAM (KlonoPIN) tablet 1 mg  1 mg Oral BID          ASSESSMENT & PLAN     DIAGNOSES REQUIRING ACTIVE TREATMENT AND MONITORING: (reviewed/updated 7/26/2017)  Patient Active Hospital Problem List:   Schizoaffective disorder, chronic condition with acute exacerbation ([facility] (7/22/2017)      Assessment: slow progress- less labile, remains hyper verbal and delusional    Plan: I will ct to adjust med- Zyprexa, Depakote,Klonopin.      Noncompliance with treatment (7/22/2017)    Assessment: Chronic problem due to poor insight      Plan: Educate about the need for medications ,consider Long Acting Antipsychotic med.      In summary, Karena Carlisle is a 30 y.o.  female who presents with a severe exacerbation of the principal diagnosis of Schizoaffective disorder, chronic condition with acute exacerbation ([facility]  Patient's condition is not stable . Patient requires continued inpatient hospitalization for further stabilization, safety monitoring and medication management. I will continue to coordinate the provision of individual, milieu, occupational, group, and substance abuse therapies to address target symptoms/diagnoses as deemed appropriate for the individual patient. A coordinated, multidisplinary treatment team round was conducted with the patient (this team consists of the nurse, psychiatric unit pharmcist,  and writer). Complete current electronic health record for patient has been reviewed today including consultant notes, ancillary staff notes, nurses and psychiatric tech notes. Suicide risk assessment completed and patient deemed to be of low risk for suicide at this time. The following regarding medications was addressed during rounds with patient:   the risks and benefits of the proposed medication. The patient was given the opportunity to ask questions. Informed consent given to the use of the above medications. Will continue to adjust psychiatric and non-psychiatric medications (see above \"medication\" section and orders section for details) as deemed appropriate & based upon diagnoses and response to treatment. I will continue to order blood tests/labs and diagnostic tests as deemed appropriate and review results as they become available (see orders for details and above listed lab/test results). I will order psychiatric records from previous [facility] hospitals to further elucidate the nature of patient's psychopathology and review once available. I will gather additional collateral information from friends, family and o/p treatment team to further elucidate the nature of patient's psychopathology and baselline level of psychiatric functioning.          I certify that this patient's inpatient psychiatric hospital services furnished since the previous certification were, and continue to be, required for treatment that could reasonably be expected to improve the patient's condition, or for diagnostic study, and that the patient continues to need, on a daily basis, active treatment furnished directly by or requiring the supervision of inpatient psychiatric facility personnel. In addition, the hospital records show that services furnished were intensive treatment services, admission or related services, or equivalent services.     EXPECTED DISCHARGE DATE/DAY: TBD     DISPOSITION:    Home       Signed By:   Barbara Kirkland MD  7/26/2017

## 2017-07-26 NOTE — PROGRESS NOTES
Bella Mac was active throughout the group in explaining why she would not participated in [address] [place] [address] about [address] [facility] on [address]  [address] [phone] [facility] ([id number])

## 2017-07-26 NOTE — BH NOTES
GROUP THERAPY PROGRESS NOTE    The patient Neris hartman 30 y.o. female is participating in [employer]. Group time: 1 hour    Personal goal for participation: To concentrate on selected task    Goal orientation: social    Group therapy participation: active    Therapeutic interventions reviewed and discussed: Crafts, games, music    Impression of participation: The patient was attentive.     Danisha Moreno  7/26/2017  5:17 PM

## 2017-07-26 NOTE — PROGRESS NOTES
Problem: Manic Behavior (Adult/Pediatric)  Goal: *STG/LTG: Complies with medication therapy  Outcome: Progressing Towards Goal  Patient is visible on the unit. Less manic and loud. Although patient mood is labile. Medication and meal complaint. Patient Zyprexa was increased. Patient does have court order petition to present to courts for previously refusing medications. Patient is attending groups and socializing with peers. Will continue  To monitor patient and assess needs.

## 2017-07-26 NOTE — BH NOTES
SOCIAL WORK NOTE:  Pt did not attend processing group.          LEO Donovan, Supervisee in Social Work

## 2017-07-27 PROCEDURE — 65220000003 HC RM SEMIPRIVATE PSYCH

## 2017-07-27 PROCEDURE — 74011250637 HC RX REV CODE- 250/637: Performed by: PSYCHIATRY & NEUROLOGY

## 2017-07-27 RX ORDER — LORAZEPAM 2 MG/ML
1 INJECTION INTRAMUSCULAR
Status: DISCONTINUED | OUTPATIENT
Start: 2017-07-27 | End: 2017-07-28

## 2017-07-27 RX ORDER — DIVALPROEX SODIUM 500 MG/1
1000 TABLET, EXTENDED RELEASE ORAL
Status: DISCONTINUED | OUTPATIENT
Start: 2017-07-27 | End: 2017-07-28

## 2017-07-27 RX ADMIN — CLONAZEPAM 1 MG: 1 TABLET ORAL at 16:29

## 2017-07-27 RX ADMIN — OLANZAPINE 20 MG: 10 TABLET, FILM COATED ORAL at 21:42

## 2017-07-27 RX ADMIN — CLONAZEPAM 1 MG: 1 TABLET ORAL at 09:18

## 2017-07-27 RX ADMIN — OLANZAPINE 5 MG: 5 TABLET, FILM COATED ORAL at 09:18

## 2017-07-27 RX ADMIN — DIVALPROEX SODIUM 1000 MG: 500 TABLET, FILM COATED, EXTENDED RELEASE ORAL at 21:42

## 2017-07-27 NOTE — BH NOTES
PSYCHIATRIC PROGRESS NOTE         Patient Name  Belia Tatum   Date of Birth 1980   [facility] [id number]   Medical Record Number  [id number]      Age  30 y.o. PCP None   Admit date:  7/21/2017    Room Number  315/02  @ [facility]   Date of Service  7/27/2017          PSYCHOTHERAPY SESSION NOTE:  Length of psychotherapy session: 15 minutes    Main condition/diagnosis/issues treated during session today, 7/27/2017 : compliance coping skills. I employed Cognitive Behavioral therapy techniques, Reality-Oriented psychotherapy, as well as supportive psychotherapy in regards to various ongoing psychosocial stressors, including the following: pre-admission and current problems; housing issues;occupational issues; academic issues; legal issues; medical issues; and stress of hospitalization. Interpersonal relationship issues and psychodynamic conflicts explored. Attempts made to alleviate maladaptive patterns. We, also, worked on issues of denial & effects of substance dependency/use     Overall, patient is not progressing    Treatment Plan Update (reviewed an updated 7/27/2017) : I will modify psychotherapy tx plan by implementing more stress management strategies, building upon cognitive behavioral techniques, increasing coping skills, as well as shoring up psychological defenses). An extended energy and skill set was needed to engage pt in psychotherapy due to some of the following: resistiveness, complexity, negativity, confrontational nature, hostile behaviors, and/or severe abnormalities in thought processes/psychosis resulting in the loss of expressive/receptive language communication skills. E & M PROGRESS NOTE:         HISTORY         HISTORY OF PRESENT ILLNESS/INTERVAL HISTORY:  (reviewed/updated 7/27/2017). CC: \"I passed out our food in the refrigerator was bad  \".  Pt admitted under a [facility] halfway order (TDO)   for severe psychosis and wendy proving to be/posing an imminent danger to selfand an inability to care for self. HISTORY OF PRESENT ILLNESS:    The patient, Michelle , is a 30 y.o. WHITE [place]  female with a past psychiatric history significant for Schizoaffective disorder bipolar type , who presents at this time with complaints of (and/or evidence of) the following emotional symptoms: agitation, psychotic behavior, wendy and psychosis. Additional symptomatology include agitation, difficulty sleeping and increased irritability,she was found wandering around ,confuse,disoritented,unkempt , not wearing shoes or undergarments. The above symptoms have been present for one day . These symptoms are of moderate in  severity. These symptoms are constant  in nature. The patient's condition has been precipitated by and psychosocial stressors . treatment noncompliance. UDS: negative; BAL=0. Patient reported that she has been diagnosed with Schizoaffective disorder, since last 2 years she is not taking any medications and not following any psychiatrist. She reported that she has been tried on Tegretol,depakote, lithium ,haldol ,Risperidone and Zyprexa. She only agree to take [address] presents/reports/evidences the following emotional symptoms today, 7/27/2017:agitation, delusions and wendy. The above symptoms have been present for one day . These symptoms are of moderate in severity. The symptoms are intermittent/ fleeting in nature. Additional symptomatology and features include remains labile in mood. hyper verbal, less disorganized but delusional. Limited insight but accepting med. Received prn med for agitation. Got agitated when called for tx team, demanding that she sleep till noon. SIDE EFFECTS: (reviewed/updated 7/27/2017)  None reported or admitted to.      ALLERGIES:(reviewed/updated 7/27/2017)  No Known Allergies   MEDICATIONS PRIOR TO ADMISSION:(reviewed/updated 7/27/2017)  Prescriptions Prior to Admission   Medication Sig    carBAMazepine XR (TEGRETOL XR) 100 mg SR tablet Take 100 mg by mouth two (2) times a day. Indications: Ana associated with Bipolar Disorder, MIXED BIPOLAR I DISORDER      PAST MEDICAL HISTORY: Past medical history from the initial psychiatric evaluation has been reviewed (reviewed/updated 7/27/2017) with no additional updates (I asked patient and no additional past medical history provided). Past Medical History:   Diagnosis Date    Anxiety disorder     Depression     Hypertension     Psychotic disorder      Past Surgical History:   Procedure Laterality Date    HX GYN        SOCIAL HISTORY: Social history from the initial psychiatric evaluation has been reviewed (reviewed/updated 7/27/2017) with no additional updates (I asked patient and no additional social history provided). Social History     Social History    Marital status: SINGLE     Spouse name: N/A    Number of children: N/A    Years of education: N/A     Occupational History    Not on file. Social History Main Topics    Smoking status: Never Smoker    Smokeless tobacco: Never Used    Alcohol use No    Drug use: No    Sexual activity: Yes     Other Topics Concern    Not on file     Social History Narrative    No narrative on file      FAMILY HISTORY: Family history from the initial psychiatric evaluation has been reviewed (reviewed/updated 7/27/2017) with no additional updates (I asked patient and no additional family history provided). History reviewed. No pertinent family history.     REVIEW OF SYSTEMS: (reviewed/updated 7/27/2017)  Appetite:improved   Sleep: improved   All other Review of Systems: Psychological ROS: positive for - hostility, irritability and mood swings  Respiratory ROS: no cough, shortness of breath, or wheezing  Cardiovascular ROS: no chest pain or dyspnea on exertion         [facility] ([facility]):    [facility] FINDINGS ARE WITHIN NORMAL LIMITS (WNL) UNLESS OTHERWISE STATED BELOW. ( ALL OF THE BELOW CATEGORIES OF THE MSE HAVE BEEN REVIEWED (reviewed 7/27/2017) AND UPDATED AS DEEMED APPROPRIATE )  General Presentation age appropriate and casually dressed, evasive, unco operative, unreliable   Orientation oriented to time, place and person   Vital Signs  See below (reviewed 7/27/2017); Vital Signs (BP, Pulse, & Temp) are within normal limits if not listed below. Gait and Station Stable/steady, no ataxia   Musculoskeletal System No extrapyramidal symptoms (EPS); no abnormal muscular movements or Tardive Dyskinesia (TD); muscle strength and tone are within normal limits   Language No aphasia or dysarthria   Speech:  hyperverbal, Pressured   Thought Processes illogical; fast rate of thoughts; poor abstract reasoning/computation   Thought Associations tangential   Thought Content Paranoid delusion, free of hallucination   Suicidal Ideations no plan  and no intention   Homicidal Ideations none   Mood:  anxious , irritable and labile ,   Affect:  Labile, mood incongruent   Memory recent  intact   Memory remote:  intact   Concentration/Attention:  intact   Fund of Knowledge average   Insight:  limited   Reliability poor   Judgment:  poor          VITALS:     No data found. Wt Readings from Last 3 Encounters:   07/21/17 68 kg (150 lb)     Temp Readings from Last 3 Encounters:   No data found for Temp     BP Readings from Last 3 Encounters:   07/26/17 108/74     Pulse Readings from Last 3 Encounters:   07/26/17 60            DATA     LABORATORY DATA:(reviewed/updated 7/27/2017)  No results found for this or any previous visit (from the past 24 hour(s)). No results found for: VALF2, VALAC, VALP, VALPR, DS6, CRBAM, CRBAMP, CARB2, XCRBAM  No results found for: LITHM   RADIOLOGY REPORTS:(reviewed/updated 7/27/2017)  No results found.        MEDICATIONS     ALL MEDICATIONS:   Current Facility-Administered Medications   Medication Dose Route Frequency    OLANZapine (ZyPREXA) tablet 20 mg  20 mg Oral QHS    divalproex ER (DEPAKOTE ER) 24 hour tablet 500 mg  500 mg Oral QHS    clonazePAM (KlonoPIN) tablet 1 mg  1 mg Oral BID    ziprasidone (GEODON) 20 mg in sterile water (preservative free) 1 mL injection  20 mg IntraMUSCular BID PRN    OLANZapine (ZyPREXA) tablet 5 mg  5 mg Oral Q6H PRN    benztropine (COGENTIN) tablet 2 mg  2 mg Oral BID PRN    benztropine (COGENTIN) injection 2 mg  2 mg IntraMUSCular BID PRN    LORazepam (ATIVAN) injection 2 mg  2 mg IntraMUSCular Q4H PRN    LORazepam (ATIVAN) tablet 1 mg  1 mg Oral Q4H PRN    zolpidem (AMBIEN) tablet 10 mg  10 mg Oral QHS PRN    acetaminophen (TYLENOL) tablet 650 mg  650 mg Oral Q4H PRN    ibuprofen (MOTRIN) tablet 400 mg  400 mg Oral Q8H PRN    magnesium hydroxide (MILK OF MAGNESIA) 400 mg/5 mL oral suspension 30 mL  30 mL Oral DAILY PRN    nicotine (NICODERM CQ) 21 mg/24 hr patch 1 Patch  1 Patch TransDERmal DAILY PRN      SCHEDULED MEDICATIONS:   Current Facility-Administered Medications   Medication Dose Route Frequency    OLANZapine (ZyPREXA) tablet 20 mg  20 mg Oral QHS    divalproex ER (DEPAKOTE ER) 24 hour tablet 500 mg  500 mg Oral QHS    clonazePAM (KlonoPIN) tablet 1 mg  1 mg Oral BID          ASSESSMENT & PLAN     DIAGNOSES REQUIRING ACTIVE TREATMENT AND MONITORING: (reviewed/updated 7/27/2017)  Patient Active Hospital Problem List:   Schizoaffective disorder, chronic condition with acute exacerbation ([facility] [address] (7/22/2017)      Assessment: slow progress- less labile, remains hyper verbal, irritable, and delusional    Plan: I will ct to adjust med- Zyprexa, Depakote,Klonopin.       Noncompliance with treatment (7/22/2017)    Assessment: Chronic problem due to poor insight  - court order granted    Plan: Educate about the need for medications ,consider Long Acting Antipsychotic med.      In summary, Marichuy Irizarry is a 30 y.o.  female who presents with a severe exacerbation of the principal diagnosis of Schizoaffective disorder, chronic condition with acute exacerbation ([facility] [address]  Patient's condition is not stable . Patient requires continued inpatient hospitalization for further stabilization, safety monitoring and medication management. I will continue to coordinate the provision of individual, milieu, occupational, group, and substance abuse therapies to address target symptoms/diagnoses as deemed appropriate for the individual patient. A coordinated, multidisplinary treatment team round was conducted with the patient (this team consists of the nurse, psychiatric unit pharmcist,  and writer). Complete current electronic health record for patient has been reviewed today including consultant notes, ancillary staff notes, nurses and psychiatric tech notes. Suicide risk assessment completed and patient deemed to be of low risk for suicide at this time. The following regarding medications was addressed during rounds with patient:   the risks and benefits of the proposed medication. The patient was given the opportunity to ask questions. Informed consent given to the use of the above medications. Will continue to adjust psychiatric and non-psychiatric medications (see above \"medication\" section and orders section for details) as deemed appropriate & based upon diagnoses and response to treatment. I will continue to order blood tests/labs and diagnostic tests as deemed appropriate and review results as they become available (see orders for details and above listed lab/test results). I will order psychiatric records from previous [facility] hospitals to further elucidate the nature of patient's psychopathology and review once available. I will gather additional collateral information from friends, family and o/p treatment team to further elucidate the nature of patient's psychopathology and baselline level of psychiatric functioning.          I certify that this patient's inpatient psychiatric hospital services furnished since the previous certification were, and continue to be, required for treatment that could reasonably be expected to improve the patient's condition, or for diagnostic study, and that the patient continues to need, on a daily basis, active treatment furnished directly by or requiring the supervision of inpatient psychiatric facility personnel. In addition, the hospital records show that services furnished were intensive treatment services, admission or related services, or equivalent services.     EXPECTED DISCHARGE DATE/DAY: TBD     DISPOSITION:    Home       Signed By:   Estrella Valdez MD  7/27/2017
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
## 2017-07-27 NOTE — BH NOTES
GROUP THERAPY PROGRESS NOTE    Pranay Hopkins is participating in Process Group. Group time: 30 minutes    Personal goal for participation: Review and discuss ways to nurture yourself    Goal orientation: personal    Group therapy participation: active and disruptive    Therapeutic interventions reviewed and discussed:   Topic: Discover possibilities for nurturing yourself     Pt reported feeling happy, content, and having peace in her mind with God. She stated that she does not socialize nor make friends, but reported that everyone should have an open mind. Pt reported that she used to talk a lot with people, but now she \"sticks to herself\". Impression of participation:   Pt presented with a full affect and elevated mood. She was an active participant and contributed to discussion. Pts behavior was disruptive due to hyperverbal pressured speech, impulsivity, and interrupting others when they were speaking. Her thoughts were disorganized with circumstantiality, flights of ideas, and little insight.     LEO Davis, Supervisee in Social Work
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
## 2017-07-28 PROCEDURE — 74011250636 HC RX REV CODE- 250/636: Performed by: PSYCHIATRY & NEUROLOGY

## 2017-07-28 PROCEDURE — 65220000003 HC RM SEMIPRIVATE PSYCH

## 2017-07-28 PROCEDURE — 74011250637 HC RX REV CODE- 250/637: Performed by: PSYCHIATRY & NEUROLOGY

## 2017-07-28 PROCEDURE — 74011000250 HC RX REV CODE- 250: Performed by: PSYCHIATRY & NEUROLOGY

## 2017-07-28 RX ORDER — LORAZEPAM 2 MG/ML
1 INJECTION INTRAMUSCULAR
Status: DISCONTINUED | OUTPATIENT
Start: 2017-07-28 | End: 2017-08-10 | Stop reason: HOSPADM

## 2017-07-28 RX ORDER — DIVALPROEX SODIUM 500 MG/1
1500 TABLET, EXTENDED RELEASE ORAL
Status: DISCONTINUED | OUTPATIENT
Start: 2017-07-28 | End: 2017-08-10 | Stop reason: HOSPADM

## 2017-07-28 RX ADMIN — CLONAZEPAM 1 MG: 1 TABLET ORAL at 08:18

## 2017-07-28 RX ADMIN — CLONAZEPAM 1 MG: 1 TABLET ORAL at 16:29

## 2017-07-28 RX ADMIN — WATER 20 MG: 1 INJECTION INTRAMUSCULAR; INTRAVENOUS; SUBCUTANEOUS at 10:36

## 2017-07-28 RX ADMIN — DIVALPROEX SODIUM 1500 MG: 500 TABLET, FILM COATED, EXTENDED RELEASE ORAL at 21:31

## 2017-07-28 RX ADMIN — ZOLPIDEM TARTRATE 10 MG: 10 TABLET, FILM COATED ORAL at 22:10

## 2017-07-28 RX ADMIN — OLANZAPINE 20 MG: 10 TABLET, FILM COATED ORAL at 21:31

## 2017-07-28 NOTE — BH NOTES
PSYCHIATRIC PROGRESS NOTE         Patient Name  Mejia Funes   Date of Birth 1980   [facility] [id number]   Medical Record Number  [id number]      Age  30 y.o. PCP None   Admit date:  7/21/2017    Room Number  315/02  @ [facility]   Date of Service  7/28/2017          PSYCHOTHERAPY SESSION NOTE:  Length of psychotherapy session: 15 minutes    Main condition/diagnosis/issues treated during session today, 7/28/2017 : compliance coping skills. I employed Cognitive Behavioral therapy techniques, Reality-Oriented psychotherapy, as well as supportive psychotherapy in regards to various ongoing psychosocial stressors, including the following: pre-admission and current problems; housing issues;occupational issues; academic issues; legal issues; medical issues; and stress of hospitalization. Interpersonal relationship issues and psychodynamic conflicts explored. Attempts made to alleviate maladaptive patterns. We, also, worked on issues of denial & effects of substance dependency/use     Overall, patient is not progressing    Treatment Plan Update (reviewed an updated 7/28/2017) : I will modify psychotherapy tx plan by implementing more stress management strategies, building upon cognitive behavioral techniques, increasing coping skills, as well as shoring up psychological defenses). An extended energy and skill set was needed to engage pt in psychotherapy due to some of the following: resistiveness, complexity, negativity, confrontational nature, hostile behaviors, and/or severe abnormalities in thought processes/psychosis resulting in the loss of expressive/receptive language communication skills. E & M PROGRESS NOTE:         HISTORY         HISTORY OF PRESENT ILLNESS/INTERVAL HISTORY:  (reviewed/updated 7/28/2017). CC: \"I passed out our food in the refrigerator was bad  \".  Pt admitted under a [facility] order (TDO)   for severe psychosis and wendy proving to be/posing an imminent danger to selfand an inability to care for self. HISTORY OF PRESENT ILLNESS:    The patient, Benoit Flowers, is a 30 y.o. WHITE [place]  female with a past psychiatric history significant for Schizoaffective disorder bipolar type , who presents at this time with complaints of (and/or evidence of) the following emotional symptoms: agitation, psychotic behavior, wendy and psychosis. Additional symptomatology include agitation, difficulty sleeping and increased irritability,she was found wandering around ,confuse,disoritented,unkempt , not wearing shoes or undergarments. The above symptoms have been present for one day . These symptoms are of moderate in  severity. These symptoms are constant  in nature. The patient's condition has been precipitated by and psychosocial stressors . treatment noncompliance. UDS: negative; BAL=0. Patient reported that she has been diagnosed with Schizoaffective disorder, since last 2 years she is not taking any medications and not following any psychiatrist. She reported that she has been tried on Tegretol,depakote, lithium ,haldol ,Risperidone and Zyprexa. She only agree to take [address] presents/reports/evidences the following emotional symptoms today, 7/28/2017:agitation, delusions and wendy. The above symptoms have been present for one day . These symptoms are of moderate in severity. The symptoms are intermittent/ fleeting in nature. Additional symptomatology and features include remains labile in mood. hyper verbal, less disorganized but delusional. Limited insight but accepting med. Received prn med for agitation. Got agitated when called for tx team, demanding that she sleep till noon. 7/28- started well but later became angry, upset and labile in mood. Hyper verbal, disorganized and delusional themes+      SIDE EFFECTS: (reviewed/updated 7/28/2017)  None reported or admitted to. ALLERGIES:(reviewed/updated 7/28/2017)  No Known Allergies   MEDICATIONS PRIOR TO ADMISSION:(reviewed/updated 7/28/2017)  Prescriptions Prior to Admission   Medication Sig    carBAMazepine XR (TEGRETOL XR) 100 mg SR tablet Take 100 mg by mouth two (2) times a day. Indications: Ana associated with Bipolar Disorder, MIXED BIPOLAR I DISORDER      PAST MEDICAL HISTORY: Past medical history from the initial psychiatric evaluation has been reviewed (reviewed/updated 7/28/2017) with no additional updates (I asked patient and no additional past medical history provided). Past Medical History:   Diagnosis Date    Anxiety disorder     Depression     Hypertension     Psychotic disorder      Past Surgical History:   Procedure Laterality Date    HX GYN        SOCIAL HISTORY: Social history from the initial psychiatric evaluation has been reviewed (reviewed/updated 7/28/2017) with no additional updates (I asked patient and no additional social history provided). Social History     Social History    Marital status: SINGLE     Spouse name: N/A    Number of children: N/A    Years of education: N/A     Occupational History    Not on file. Social History Main Topics    Smoking status: Never Smoker    Smokeless tobacco: Never Used    Alcohol use No    Drug use: No    Sexual activity: Yes     Other Topics Concern    Not on file     Social History Narrative    No narrative on file      FAMILY HISTORY: Family history from the initial psychiatric evaluation has been reviewed (reviewed/updated 7/28/2017) with no additional updates (I asked patient and no additional family history provided). History reviewed. No pertinent family history.     REVIEW OF SYSTEMS: (reviewed/updated 7/28/2017)  Appetite:improved   Sleep: improved   All other Review of Systems: Psychological ROS: positive for - hostility, irritability and mood swings  Respiratory ROS: no cough, shortness of breath, or wheezing  Cardiovascular ROS: no chest pain or dyspnea on exertion         [address] (MSE):    MSE FINDINGS ARE WITHIN NORMAL LIMITS (WNL) UNLESS OTHERWISE STATED BELOW. ( ALL OF THE BELOW CATEGORIES OF THE MSE HAVE BEEN REVIEWED (reviewed 7/28/2017) AND UPDATED AS DEEMED APPROPRIATE )  General Presentation age appropriate and casually dressed, evasive, unco operative, unreliable   Orientation oriented to time, place and person   Vital Signs  See below (reviewed 7/28/2017); Vital Signs (BP, Pulse, & Temp) are within normal limits if not listed below. Gait and Station Stable/steady, no ataxia   Musculoskeletal System No extrapyramidal symptoms (EPS); no abnormal muscular movements or Tardive Dyskinesia (TD); muscle strength and tone are within normal limits   Language No aphasia or dysarthria   Speech:  hyperverbal, Pressured   Thought Processes illogical; fast rate of thoughts; poor abstract reasoning/computation   Thought Associations tangential   Thought Content Paranoid delusion, free of hallucination   Suicidal Ideations no plan  and no intention   Homicidal Ideations none   Mood:  anxious , irritable and labile ,   Affect:  Labile, hostile, mood incongruent   Memory recent  intact   Memory remote:  intact   Concentration/Attention:  intact   Fund of Knowledge average   Insight:  limited   Reliability poor   Judgment:  poor          VITALS:     Patient Vitals for the past 24 hrs:   Pulse BP   07/27/17 2122 86 124/78     Wt Readings from Last 3 Encounters:   07/21/17 68 kg (150 lb)     Temp Readings from Last 3 Encounters:   No data found for Temp     BP Readings from Last 3 Encounters:   07/27/17 124/78     Pulse Readings from Last 3 Encounters:   07/27/17 86            DATA     LABORATORY DATA:(reviewed/updated 7/28/2017)  No results found for this or any previous visit (from the past 24 hour(s)).   No results found for: VALF2, VALAC, VALP, VALPR, DS6, CRBAM, CRBAMP, CARB2, XCRBAM  No results found for: [facility]   RADIOLOGY REPORTS:(reviewed/updated 7/28/2017)  No results found.        MEDICATIONS     ALL MEDICATIONS:   Current Facility-Administered Medications   Medication Dose Route Frequency    divalproex ER (DEPAKOTE ER) 24 hour tablet 1,500 mg  1,500 mg Oral QHS    Or    LORazepam (ATIVAN) injection 1 mg +++COURT ORDERED MEDICATION FOR REFUSAL OF PO DEPAKOTE+++  1 mg IntraMUSCular QHS    OLANZapine (ZyPREXA) tablet 20 mg  20 mg Oral QHS    clonazePAM (KlonoPIN) tablet 1 mg  1 mg Oral BID    ziprasidone (GEODON) 20 mg in sterile water (preservative free) 1 mL injection  20 mg IntraMUSCular BID PRN    OLANZapine (ZyPREXA) tablet 5 mg  5 mg Oral Q6H PRN    benztropine (COGENTIN) tablet 2 mg  2 mg Oral BID PRN    benztropine (COGENTIN) injection 2 mg  2 mg IntraMUSCular BID PRN    LORazepam (ATIVAN) injection 2 mg  2 mg IntraMUSCular Q4H PRN    LORazepam (ATIVAN) tablet 1 mg  1 mg Oral Q4H PRN    zolpidem (AMBIEN) tablet 10 mg  10 mg Oral QHS PRN    acetaminophen (TYLENOL) tablet 650 mg  650 mg Oral Q4H PRN    ibuprofen (MOTRIN) tablet 400 mg  400 mg Oral Q8H PRN    magnesium hydroxide (MILK OF MAGNESIA) 400 mg/5 mL oral suspension 30 mL  30 mL Oral DAILY PRN    nicotine (NICODERM CQ) 21 mg/24 hr patch 1 Patch  1 Patch TransDERmal DAILY PRN      SCHEDULED MEDICATIONS:   Current Facility-Administered Medications   Medication Dose Route Frequency    divalproex ER (DEPAKOTE ER) 24 hour tablet 1,500 mg  1,500 mg Oral QHS    Or    LORazepam (ATIVAN) injection 1 mg +++COURT ORDERED MEDICATION FOR REFUSAL OF PO DEPAKOTE+++  1 mg IntraMUSCular QHS    OLANZapine (ZyPREXA) tablet 20 mg  20 mg Oral QHS    clonazePAM (KlonoPIN) tablet 1 mg  1 mg Oral BID          ASSESSMENT & PLAN     DIAGNOSES REQUIRING ACTIVE TREATMENT AND MONITORING: (reviewed/updated 7/28/2017)  Patient Active Hospital Problem List:   Schizoaffective disorder, chronic condition with acute exacerbation ([facility] [address] (7/22/2017)      Assessment: minimal change-labile, remains hyper verbal, irritable, and delusional    Plan: I will ct to adjust med- Zyprexa, Depakote,Klonopin. Noncompliance with treatment (7/22/2017)    Assessment: Chronic problem due to poor insight  - court order granted    Plan: Educate about the need for medications ,consider Long Acting Antipsychotic med.      In summary, Teodora Moss, is a 30 y.o.  female who presents with a severe exacerbation of the principal diagnosis of Schizoaffective disorder, chronic condition with acute exacerbation ([facility] [address]  Patient's condition is not stable . Patient requires continued inpatient hospitalization for further stabilization, safety monitoring and medication management. I will continue to coordinate the provision of individual, milieu, occupational, group, and substance abuse therapies to address target symptoms/diagnoses as deemed appropriate for the individual patient. A coordinated, multidisplinary treatment team round was conducted with the patient (this team consists of the nurse, psychiatric unit pharmcist,  and writer). Complete current electronic health record for patient has been reviewed today including consultant notes, ancillary staff notes, nurses and psychiatric tech notes. Suicide risk assessment completed and patient deemed to be of low risk for suicide at this time. The following regarding medications was addressed during rounds with patient:   the risks and benefits of the proposed medication. The patient was given the opportunity to ask questions. Informed consent given to the use of the above medications. Will continue to adjust psychiatric and non-psychiatric medications (see above \"medication\" section and orders section for details) as deemed appropriate & based upon diagnoses and response to treatment.      I will continue to order blood tests/labs and diagnostic tests as deemed appropriate and review results as they become available (see orders for details and above listed lab/test results). I will order psychiatric records from previous [facility] hospitals to further elucidate the nature of patient's psychopathology and review once available. I will gather additional collateral information from friends, family and o/p treatment team to further elucidate the nature of patient's psychopathology and baselline level of psychiatric functioning. I certify that this patient's inpatient psychiatric hospital services furnished since the previous certification were, and continue to be, required for treatment that could reasonably be expected to improve the patient's condition, or for diagnostic study, and that the patient continues to need, on a daily basis, active treatment furnished directly by or requiring the supervision of inpatient psychiatric facility personnel. In addition, the hospital records show that services furnished were intensive treatment services, admission or related services, or equivalent services.     EXPECTED DISCHARGE DATE/DAY: TBD     DISPOSITION:    Home       Signed By:   Arelis Morillo MD  7/28/2017

## 2017-07-28 NOTE — BH NOTES
GROUP THERAPY PROGRESS NOTE    The patient Wendy hartman 30 y.o. female is participating in [employer]. Group time: 1 hour    Personal goal for participation: To concentrate on selected task    Goal orientation: social    Group therapy participation: active    Therapeutic interventions reviewed and discussed: Crafts, games, music    Impression of participation: The patient was attentive.     Sanjeev Arredondo  7/28/2017  5:49 PM

## 2017-07-28 NOTE — BH NOTES
GROUP THERAPY PROGRESS NOTE    The patient Teodora Moss is participating in [employer]. Group time: 30 minutes    Personal goal for participation: to orient the patient to the unit.     Goal orientation: successful adoption of unit rules    Group therapy participation: minimal    Therapeutic interventions reviewed and discussed: Yes    Impression of participation: minimal    Clement Reaves  7/28/2017 3:13 PM

## 2017-07-28 NOTE — BH NOTES
Patient is complaint to meals and medication . Patient has attend to group activity Patient required a lot of redirecting on the unit. Patient is very highly talkive on the floor patient was required to spend some time in open Seclusion and was admister medication patient seem to be much more calmer when she can out. Patient socialize well with her peers and staff. Patient will remain on an Q 15 min check for safety.

## 2017-07-28 NOTE — BH NOTES
GROUP THERAPY PROGRESS NOTE    The patient Judith hartman 30 y.o. female is participating in [employer]lections Group    Group time: 30 minutes    Personal goal for participation: To discuss the daily goals    Goal orientation: personal    Group therapy participation: minimal    Therapeutic interventions reviewed and discussed:  Yes    Impression of participation: lori Person  7/27/2017  10:20 PM

## 2017-07-28 NOTE — BH NOTES
Patient is loud. Limited insight. Requires lots of redirection. Patient remains manic. Disorganized. Yelling. Patient placed in open seclusion given Geodon 20mg IM. Will continue to monitor patient and assess needs.

## 2017-07-29 LAB
GLUCOSE BLD STRIP.AUTO-MCNC: 90 MG/DL (ref 65–100)
SERVICE CMNT-IMP: NORMAL

## 2017-07-29 PROCEDURE — 74011250637 HC RX REV CODE- 250/637: Performed by: PSYCHIATRY & NEUROLOGY

## 2017-07-29 PROCEDURE — 65220000003 HC RM SEMIPRIVATE PSYCH

## 2017-07-29 PROCEDURE — 82962 GLUCOSE BLOOD TEST: CPT

## 2017-07-29 RX ADMIN — OLANZAPINE 5 MG: 5 TABLET, FILM COATED ORAL at 11:36

## 2017-07-29 RX ADMIN — ZOLPIDEM TARTRATE 10 MG: 10 TABLET, FILM COATED ORAL at 21:08

## 2017-07-29 RX ADMIN — OLANZAPINE 20 MG: 10 TABLET, FILM COATED ORAL at 21:08

## 2017-07-29 RX ADMIN — DIVALPROEX SODIUM 1500 MG: 500 TABLET, FILM COATED, EXTENDED RELEASE ORAL at 21:08

## 2017-07-29 RX ADMIN — LORAZEPAM 1 MG: 1 TABLET ORAL at 11:36

## 2017-07-29 RX ADMIN — CLONAZEPAM 1 MG: 1 TABLET ORAL at 16:44

## 2017-07-29 RX ADMIN — CLONAZEPAM 1 MG: 1 TABLET ORAL at 07:57

## 2017-07-29 NOTE — BH NOTES
Patient was observed to be sleeping for 8 hours without any distress and even respirations. Q 15 minutes monitoring maintained for the safety and support.

## 2017-07-29 NOTE — BH NOTES
PSYCHIATRIC PROGRESS NOTE         Patient Name  Gemma Sarah   Date of Birth 1980   [facility] [id number]   Medical Record Number  [id number]      Age  30 y.o. PCP None   Admit date:  7/21/2017    Room Number  185/04  @ [facility]   Date of Service  7/29/2017          PSYCHOTHERAPY SESSION NOTE:  Length of psychotherapy session: 15 minutes    Main condition/diagnosis/issues treated during session today, 7/29/2017 : compliance coping skills. I employed Cognitive Behavioral therapy techniques, Reality-Oriented psychotherapy, as well as supportive psychotherapy in regards to various ongoing psychosocial stressors, including the following: pre-admission and current problems; housing issues;occupational issues; academic issues; legal issues; medical issues; and stress of hospitalization. Interpersonal relationship issues and psychodynamic conflicts explored. Attempts made to alleviate maladaptive patterns. We, also, worked on issues of denial & effects of substance dependency/use     Overall, patient is minimally progressing    Treatment Plan Update (reviewed an updated 7/29/2017) : I will modify psychotherapy tx plan by implementing more stress management strategies, building upon cognitive behavioral techniques, increasing coping skills, as well as shoring up psychological defenses). An extended energy and skill set was needed to engage pt in psychotherapy due to some of the following: resistiveness, complexity, negativity, confrontational nature, hostile behaviors, and/or severe abnormalities in thought processes/psychosis resulting in the loss of expressive/receptive language communication skills. E & M PROGRESS NOTE:         HISTORY         HISTORY OF PRESENT ILLNESS/INTERVAL HISTORY:  (reviewed/updated 7/29/2017). CC: \"I passed out our food in the refrigerator was bad  \".  Pt admitted under a [facility] order (TDO)   for severe psychosis and wendy proving to be/posing an imminent danger to selfand an inability to care for self. HISTORY OF PRESENT ILLNESS:    The patient, Ash Cuello, is a 30 y.o. WHITE [place]  female with a past psychiatric history significant for Schizoaffective disorder bipolar type , who presents at this time with complaints of (and/or evidence of) the following emotional symptoms: agitation, psychotic behavior, wendy and psychosis. Additional symptomatology include agitation, difficulty sleeping and increased irritability,she was found wandering around ,confuse,disoritented,unkempt , not wearing shoes or undergarments. The above symptoms have been present for one day . These symptoms are of moderate in  severity. These symptoms are constant  in nature. The patient's condition has been precipitated by and psychosocial stressors . treatment noncompliance. UDS: negative; BAL=0. Patient reported that she has been diagnosed with Schizoaffective disorder, since last 2 years she is not taking any medications and not following any psychiatrist. She reported that she has been tried on Tegretol,depakote, lithium ,haldol ,Risperidone and Zyprexa. She only agree to take [address] presents/reports/evidences the following emotional symptoms today, 7/29/2017:agitation, delusions and wendy. The above symptoms have been present for one day . These symptoms are of moderate in severity. The symptoms are intermittent/ fleeting in nature. Additional symptomatology and features include remains labile in mood. hyper verbal, less disorganized but delusional. Limited insight but accepting med. Received prn med for agitation. Got agitated when called for tx team, demanding that she sleep till noon. 7/29 argumentative, poor insight- easily get upset and labile in mood. Hyper verbal, disorganized and delusional themes+      SIDE EFFECTS: (reviewed/updated 7/29/2017)  None reported or admitted to. ALLERGIES:(reviewed/updated 7/29/2017)  No Known Allergies   MEDICATIONS PRIOR TO ADMISSION:(reviewed/updated 7/29/2017)  Prescriptions Prior to Admission   Medication Sig    carBAMazepine XR (TEGRETOL XR) 100 mg SR tablet Take 100 mg by mouth two (2) times a day. Indications: Ana associated with Bipolar Disorder, MIXED BIPOLAR I DISORDER      PAST MEDICAL HISTORY: Past medical history from the initial psychiatric evaluation has been reviewed (reviewed/updated 7/29/2017) with no additional updates (I asked patient and no additional past medical history provided). Past Medical History:   Diagnosis Date    Anxiety disorder     Depression     Hypertension     Psychotic disorder      Past Surgical History:   Procedure Laterality Date    HX GYN        SOCIAL HISTORY: Social history from the initial psychiatric evaluation has been reviewed (reviewed/updated 7/29/2017) with no additional updates (I asked patient and no additional social history provided). Social History     Social History    Marital status: SINGLE     Spouse name: N/A    Number of children: N/A    Years of education: N/A     Occupational History    Not on file. Social History Main Topics    Smoking status: Never Smoker    Smokeless tobacco: Never Used    Alcohol use No    Drug use: No    Sexual activity: Yes     Other Topics Concern    Not on file     Social History Narrative    No narrative on file      FAMILY HISTORY: Family history from the initial psychiatric evaluation has been reviewed (reviewed/updated 7/29/2017) with no additional updates (I asked patient and no additional family history provided). History reviewed. No pertinent family history.     REVIEW OF SYSTEMS: (reviewed/updated 7/29/2017)  Appetite:improved   Sleep: improved   All other Review of Systems: Psychological ROS: positive for - hostility, irritability and mood swings  Respiratory ROS: no cough, shortness of breath, or wheezing  Cardiovascular ROS: no chest pain or dyspnea on exertion         [address] [employer] (MSE):    MSE FINDINGS ARE WITHIN NORMAL LIMITS (WNL) UNLESS OTHERWISE STATED BELOW. ( ALL OF THE BELOW CATEGORIES OF THE MSE HAVE BEEN REVIEWED (reviewed 7/29/2017) AND UPDATED AS DEEMED APPROPRIATE )  General Presentation age appropriate and casually dressed, evasive, unco operative, unreliable   Orientation oriented to time, place and person   Vital Signs  See below (reviewed 7/29/2017); Vital Signs (BP, Pulse, & Temp) are within normal limits if not listed below.    Gait and Station Stable/steady, no ataxia   Musculoskeletal System No extrapyramidal symptoms (EPS); no abnormal muscular movements or Tardive Dyskinesia (TD); muscle strength and tone are within normal limits   Language No aphasia or dysarthria   Speech:  hyperverbal, Pressured   Thought Processes illogical; fast rate of thoughts; poor abstract reasoning/computation   Thought Associations tangential   Thought Content Paranoid delusion, free of hallucination   Suicidal Ideations no plan  and no intention   Homicidal Ideations none   Mood:  anxious , irritable and labile ,   Affect:  Labile, hostile, mood incongruent   Memory recent  intact   Memory remote:  intact   Concentration/Attention:  intact   Fund of Knowledge average   Insight:  limited   Reliability poor   Judgment:  poor          VITALS:     Patient Vitals for the past 24 hrs:   Temp Pulse Resp BP   07/29/17 0600 97 °F (36.1 °C) 98 18 138/89     Wt Readings from Last 3 Encounters:   07/21/17 68 kg (150 lb)     Temp Readings from Last 3 Encounters:   07/29/17 97 °F (36.1 °C)     BP Readings from Last 3 Encounters:   07/29/17 138/89     Pulse Readings from Last 3 Encounters:   07/29/17 98            DATA     LABORATORY DATA:(reviewed/updated 7/29/2017)  Recent Results (from the past 24 hour(s))   GLUCOSE, POC    Collection Time: 07/29/17  7:32 AM   Result Value Ref Range    Glucose (POC) 90 65 - 100 mg/dL    Performed by [facility]      No results found for: VALF2, VALAC, VALP, VALPR, DS6, CRBAM, CRBAMP, CARB2, XCRBAM  No results found for: LITHM   RADIOLOGY REPORTS:(reviewed/updated 7/29/2017)  No results found.        MEDICATIONS     ALL MEDICATIONS:   Current Facility-Administered Medications   Medication Dose Route Frequency    divalproex ER (DEPAKOTE ER) 24 hour tablet 1,500 mg  1,500 mg Oral QHS    Or    LORazepam (ATIVAN) injection 1 mg +++COURT ORDERED MEDICATION FOR REFUSAL OF PO DEPAKOTE+++  1 mg IntraMUSCular QHS    OLANZapine (ZyPREXA) tablet 20 mg  20 mg Oral QHS    clonazePAM (KlonoPIN) tablet 1 mg  1 mg Oral BID    ziprasidone (GEODON) 20 mg in sterile water (preservative free) 1 mL injection  20 mg IntraMUSCular BID PRN    OLANZapine (ZyPREXA) tablet 5 mg  5 mg Oral Q6H PRN    benztropine (COGENTIN) tablet 2 mg  2 mg Oral BID PRN    benztropine (COGENTIN) injection 2 mg  2 mg IntraMUSCular BID PRN    LORazepam (ATIVAN) injection 2 mg  2 mg IntraMUSCular Q4H PRN    LORazepam (ATIVAN) tablet 1 mg  1 mg Oral Q4H PRN    zolpidem (AMBIEN) tablet 10 mg  10 mg Oral QHS PRN    acetaminophen (TYLENOL) tablet 650 mg  650 mg Oral Q4H PRN    ibuprofen (MOTRIN) tablet 400 mg  400 mg Oral Q8H PRN    magnesium hydroxide (MILK OF MAGNESIA) 400 mg/5 mL oral suspension 30 mL  30 mL Oral DAILY PRN    nicotine (NICODERM CQ) 21 mg/24 hr patch 1 Patch  1 Patch TransDERmal DAILY PRN      SCHEDULED MEDICATIONS:   Current Facility-Administered Medications   Medication Dose Route Frequency    divalproex ER (DEPAKOTE ER) 24 hour tablet 1,500 mg  1,500 mg Oral QHS    Or    LORazepam (ATIVAN) injection 1 mg +++COURT ORDERED MEDICATION FOR REFUSAL OF PO DEPAKOTE+++  1 mg IntraMUSCular QHS    OLANZapine (ZyPREXA) tablet 20 mg  20 mg Oral QHS    clonazePAM (KlonoPIN) tablet 1 mg  1 mg Oral BID          ASSESSMENT & PLAN     DIAGNOSES REQUIRING ACTIVE TREATMENT AND MONITORING: (reviewed/updated 7/29/2017)  Patient C/Job Sanchez [address] Problem List:   Schizoaffective disorder, chronic condition with acute exacerbation ([facility] [address] (7/22/2017)      Assessment: slow progress-labile, remains hyper verbal, irritable, and delusional    Plan: I will ct to adjust med- Zyprexa, Depakote,Klonopin. Noncompliance with treatment (7/22/2017)    Assessment: Chronic problem due to poor insight  - court order granted    Plan: Educate about the need for medications ,consider Long Acting Antipsychotic med.      In summary, Wendy Douglass, is a 30 y.o.  female who presents with a severe exacerbation of the principal diagnosis of Schizoaffective disorder, chronic condition with acute exacerbation ([facility] [address]     Patient's condition is not stable . Patient requires continued inpatient hospitalization for further stabilization, safety monitoring and medication management. I will continue to coordinate the provision of individual, milieu, occupational, group, and substance abuse therapies to address target symptoms/diagnoses as deemed appropriate for the individual patient. A coordinated, multidisplinary treatment team round was conducted with the patient (this team consists of the nurse, psychiatric unit pharmcist,  and writer). Complete current electronic health record for patient has been reviewed today including consultant notes, ancillary staff notes, nurses and psychiatric tech notes. Suicide risk assessment completed and patient deemed to be of low risk for suicide at this time. The following regarding medications was addressed during rounds with patient:   the risks and benefits of the proposed medication. The patient was given the opportunity to ask questions. Informed consent given to the use of the above medications.  Will continue to adjust psychiatric and non-psychiatric medications (see above \"medication\" section and orders section for details) as deemed appropriate & based upon diagnoses and response to treatment. I will continue to order blood tests/labs and diagnostic tests as deemed appropriate and review results as they become available (see orders for details and above listed lab/test results). I will order psychiatric records from previous [facility] hospitals to further elucidate the nature of patient's psychopathology and review once available. I will gather additional collateral information from friends, family and o/p treatment team to further elucidate the nature of patient's psychopathology and baselline level of psychiatric functioning. I certify that this patient's inpatient psychiatric hospital services furnished since the previous certification were, and continue to be, required for treatment that could reasonably be expected to improve the patient's condition, or for diagnostic study, and that the patient continues to need, on a daily basis, active treatment furnished directly by or requiring the supervision of inpatient psychiatric facility personnel. In addition, the hospital records show that services furnished were intensive treatment services, admission or related services, or equivalent services.     EXPECTED DISCHARGE DATE/DAY: TBD     DISPOSITION:    Home       Signed By:   Abisai Sexton MD  7/29/2017

## 2017-07-29 NOTE — BH NOTES
Pt was received up, alert, and visible on unit. Pt is meal and med compliant. Pt attends groups. Pt continues to be hyper verbal, intrusive, demanding, and threatening towards peers. Pt and roommate had another verbal altercation, staff intervened and as a result charge nurse changed this pt's room. Pt became upset, verbally abusive towards staff and was placed in open seclusion. Pt is currently in her new room, a bit less agitated and quiet. Staff will continue to monitor pt Q 15 min checks for safety and support.

## 2017-07-29 NOTE — BH NOTES
GROUP THERAPY PROGRESS NOTE    The patient Meme March is participating in [employer]. Group time: 30 minutes    Personal goal for participation: to orient the patient to the unit.     Goal orientation: successful adoption of unit rules    Group therapy participation: minimal    Therapeutic interventions reviewed and discussed: Yes    Impression of participation: minimal    Sammie Jimenez  7/29/2017 1:48 PM

## 2017-07-29 NOTE — BH NOTES
Pt remains verbally agitated and threatening her roommate. Pt was moved to a different room for safety. Pt had difficulty following directions and difficult to redirect. Ativan 1mg and Zyprexa 5mg given po for anxiety.

## 2017-07-30 PROCEDURE — 65220000003 HC RM SEMIPRIVATE PSYCH

## 2017-07-30 PROCEDURE — 74011250637 HC RX REV CODE- 250/637: Performed by: PSYCHIATRY & NEUROLOGY

## 2017-07-30 RX ADMIN — OLANZAPINE 5 MG: 5 TABLET, FILM COATED ORAL at 09:05

## 2017-07-30 RX ADMIN — OLANZAPINE 20 MG: 10 TABLET, FILM COATED ORAL at 21:25

## 2017-07-30 RX ADMIN — CLONAZEPAM 1 MG: 1 TABLET ORAL at 17:23

## 2017-07-30 RX ADMIN — CLONAZEPAM 1 MG: 1 TABLET ORAL at 09:05

## 2017-07-30 RX ADMIN — ZOLPIDEM TARTRATE 10 MG: 10 TABLET, FILM COATED ORAL at 21:25

## 2017-07-30 RX ADMIN — DIVALPROEX SODIUM 1500 MG: 500 TABLET, FILM COATED, EXTENDED RELEASE ORAL at 21:25

## 2017-07-30 NOTE — BH NOTES
GROUP THERAPY PROGRESS NOTE    The patient Alyson Brothers is participating in [employer]. Group time: 30 minutes    Personal goal for participation: to orient the patient to the unit.     Goal orientation: successful adoption of unit rules    Group therapy participation: minimal    Therapeutic interventions reviewed and discussed: Yes    Impression of participation: lori Inman  7/30/2017 11:31 AM

## 2017-07-30 NOTE — PROGRESS NOTES
Problem: Manic Behavior (Adult/Pediatric)  Goal: *STG: Demonstrates improvement in thought process and speech pattern  Outcome: Progressing Towards Goal    Pt is compliant with medications and meals. Pt is hyper-verbal and intrusive and requires redirection many times throughtout the shift. Pt denies A/V hallucinations and denies S/H ideations. Pt is visible on the unit. She is loud, demanding and labile. Will continue to monitor pt q15 minutes for safety and support.

## 2017-07-30 NOTE — BH NOTES
PSYCHIATRIC PROGRESS NOTE         Patient Name  Blaise Polo   Date of Birth 1980   [facility] [id number]   Medical Record Number  [id number]      Age  30 y.o. PCP None   Admit date:  7/21/2017    Room Number  896/91  @ [facility]   Date of Service  7/30/2017          PSYCHOTHERAPY SESSION NOTE:  Length of psychotherapy session: 15 minutes    Main condition/diagnosis/issues treated during session today, 7/30/2017 : compliance coping skills. irritability    I employed Cognitive Behavioral therapy techniques, Reality-Oriented psychotherapy, as well as supportive psychotherapy in regards to various ongoing psychosocial stressors, including the following: pre-admission and current problems; housing issues;occupational issues; academic issues; legal issues; medical issues; and stress of hospitalization. Interpersonal relationship issues and psychodynamic conflicts explored. Attempts made to alleviate maladaptive patterns. We, also, worked on issues of denial & effects of substance dependency/use     Overall, patient is minimally progressing    Treatment Plan Update (reviewed an updated 7/30/2017) : I will modify psychotherapy tx plan by implementing more stress management strategies, building upon cognitive behavioral techniques, increasing coping skills, as well as shoring up psychological defenses). An extended energy and skill set was needed to engage pt in psychotherapy due to some of the following: resistiveness, complexity, negativity, confrontational nature, hostile behaviors, and/or severe abnormalities in thought processes/psychosis resulting in the loss of expressive/receptive language communication skills. E & M PROGRESS NOTE:         HISTORY         HISTORY OF PRESENT ILLNESS/INTERVAL HISTORY:  (reviewed/updated 7/30/2017). CC: \"I am doing perfect\"  \".  Pt admitted under a [facility] intermediate order (TDO)   for severe psychosis and wendy proving to be/posing an imminent danger to selfand an inability to care for self. HISTORY OF PRESENT ILLNESS:    The patient, Gemma Sarah, is a 30 y.o. WHITE [place]  female with a past psychiatric history significant for Schizoaffective disorder bipolar type , who presents at this time with complaints of (and/or evidence of) the following emotional symptoms: agitation, psychotic behavior, wendy and psychosis. Additional symptomatology include agitation, difficulty sleeping and increased irritability,she was found wandering around ,confuse,disoritented,unkempt , not wearing shoes or undergarments. The above symptoms have been present for one day . These symptoms are of moderate in  severity. These symptoms are constant  in nature. The patient's condition has been precipitated by and psychosocial stressors . treatment noncompliance. UDS: negative; BAL=0. Patient reported that she has been diagnosed with Schizoaffective disorder, since last 2 years she is not taking any medications and not following any psychiatrist. She reported that she has been tried on Tegretol,depakote, lithium ,haldol ,Risperidone and Zyprexa. She only agree to take [address] presents/reports/evidences the following emotional symptoms today, 7/30/2017:agitation, delusions and wendy. The above symptoms have been present for one day . These symptoms are of moderate in severity. The symptoms are intermittent/ fleeting in nature. Additional symptomatology and features include remains labile in mood. hyper verbal, less disorganized but delusional. Limited insight but accepting med. Received prn med for agitation. Got agitated when called for tx team, demanding that she sleep till noon. 7/30- no sig change- intrusive, argumentative towards staff and peer, poor insight and preoccupied with dc.  Hyper verbal, disorganized and delusional themes+      SIDE EFFECTS: (reviewed/updated 7/30/2017)  None reported or admitted to. ALLERGIES:(reviewed/updated 7/30/2017)  No Known Allergies   MEDICATIONS PRIOR TO ADMISSION:(reviewed/updated 7/30/2017)  Prescriptions Prior to Admission   Medication Sig    carBAMazepine XR (TEGRETOL XR) 100 mg SR tablet Take 100 mg by mouth two (2) times a day. Indications: Ana associated with Bipolar Disorder, MIXED BIPOLAR I DISORDER      PAST MEDICAL HISTORY: Past medical history from the initial psychiatric evaluation has been reviewed (reviewed/updated 7/30/2017) with no additional updates (I asked patient and no additional past medical history provided). Past Medical History:   Diagnosis Date    Anxiety disorder     Depression     Hypertension     Psychotic disorder      Past Surgical History:   Procedure Laterality Date    HX GYN        SOCIAL HISTORY: Social history from the initial psychiatric evaluation has been reviewed (reviewed/updated 7/30/2017) with no additional updates (I asked patient and no additional social history provided). Social History     Social History    Marital status: SINGLE     Spouse name: N/A    Number of children: N/A    Years of education: N/A     Occupational History    Not on file. Social History Main Topics    Smoking status: Never Smoker    Smokeless tobacco: Never Used    Alcohol use No    Drug use: No    Sexual activity: Yes     Other Topics Concern    Not on file     Social History Narrative    No narrative on file      FAMILY HISTORY: Family history from the initial psychiatric evaluation has been reviewed (reviewed/updated 7/30/2017) with no additional updates (I asked patient and no additional family history provided). History reviewed. No pertinent family history.     REVIEW OF SYSTEMS: (reviewed/updated 7/30/2017)  Appetite:improved   Sleep: improved   All other Review of Systems: Psychological ROS: positive for - hostility, irritability and mood swings  Respiratory ROS: no cough, shortness of breath, or wheezing  Cardiovascular ROS: no chest pain or dyspnea on exertion         [facility] (MSE):    MSE FINDINGS ARE WITHIN NORMAL LIMITS (WNL) UNLESS OTHERWISE STATED BELOW. ( ALL OF THE BELOW CATEGORIES OF THE MSE HAVE BEEN REVIEWED (reviewed 7/30/2017) AND UPDATED AS DEEMED APPROPRIATE )  General Presentation age appropriate and casually dressed, evasive, unco operative, unreliable   Orientation oriented to time, place and person   Vital Signs  See below (reviewed 7/30/2017); Vital Signs (BP, Pulse, & Temp) are within normal limits if not listed below. Gait and Station Stable/steady, no ataxia   Musculoskeletal System No extrapyramidal symptoms (EPS); no abnormal muscular movements or Tardive Dyskinesia (TD); muscle strength and tone are within normal limits   Language No aphasia or dysarthria   Speech:  hyperverbal, Pressured   Thought Processes illogical; fast rate of thoughts; poor abstract reasoning/computation   Thought Associations tangential   Thought Content Paranoid delusion, free of hallucination   Suicidal Ideations no plan  and no intention   Homicidal Ideations none   Mood:  anxious , irritable and labile ,   Affect:  Labile, hostile, mood incongruent   Memory recent  intact   Memory remote:  intact   Concentration/Attention:  intact   Fund of Knowledge average   Insight:  limited   Reliability poor   Judgment:  poor          VITALS:     Patient Vitals for the past 24 hrs:   Temp Pulse Resp BP   07/30/17 1200 97.4 °F (36.3 °C) 75 18 103/70     Wt Readings from Last 3 Encounters:   07/21/17 68 kg (150 lb)     Temp Readings from Last 3 Encounters:   07/30/17 97.4 °F (36.3 °C)     BP Readings from Last 3 Encounters:   07/30/17 103/70     Pulse Readings from Last 3 Encounters:   07/30/17 75            DATA     LABORATORY DATA:(reviewed/updated 7/30/2017)  No results found for this or any previous visit (from the past 24 hour(s)).   No results found for: Huseyin Howard, VALP, VALPR, DS6, CRBAM, CRBAMP, CARB2, XCRBAM  No results found for: LITHM   RADIOLOGY REPORTS:(reviewed/updated 7/30/2017)  No results found.        MEDICATIONS     ALL MEDICATIONS:   Current Facility-Administered Medications   Medication Dose Route Frequency    divalproex ER (DEPAKOTE ER) 24 hour tablet 1,500 mg  1,500 mg Oral QHS    Or    LORazepam (ATIVAN) injection 1 mg +++COURT ORDERED MEDICATION FOR REFUSAL OF PO DEPAKOTE+++  1 mg IntraMUSCular QHS    OLANZapine (ZyPREXA) tablet 20 mg  20 mg Oral QHS    clonazePAM (KlonoPIN) tablet 1 mg  1 mg Oral BID    ziprasidone (GEODON) 20 mg in sterile water (preservative free) 1 mL injection  20 mg IntraMUSCular BID PRN    OLANZapine (ZyPREXA) tablet 5 mg  5 mg Oral Q6H PRN    benztropine (COGENTIN) tablet 2 mg  2 mg Oral BID PRN    benztropine (COGENTIN) injection 2 mg  2 mg IntraMUSCular BID PRN    LORazepam (ATIVAN) injection 2 mg  2 mg IntraMUSCular Q4H PRN    LORazepam (ATIVAN) tablet 1 mg  1 mg Oral Q4H PRN    zolpidem (AMBIEN) tablet 10 mg  10 mg Oral QHS PRN    acetaminophen (TYLENOL) tablet 650 mg  650 mg Oral Q4H PRN    ibuprofen (MOTRIN) tablet 400 mg  400 mg Oral Q8H PRN    magnesium hydroxide (MILK OF MAGNESIA) 400 mg/5 mL oral suspension 30 mL  30 mL Oral DAILY PRN    nicotine (NICODERM CQ) 21 mg/24 hr patch 1 Patch  1 Patch TransDERmal DAILY PRN      SCHEDULED MEDICATIONS:   Current Facility-Administered Medications   Medication Dose Route Frequency    divalproex ER (DEPAKOTE ER) 24 hour tablet 1,500 mg  1,500 mg Oral QHS    Or    LORazepam (ATIVAN) injection 1 mg +++COURT ORDERED MEDICATION FOR REFUSAL OF PO DEPAKOTE+++  1 mg IntraMUSCular QHS    OLANZapine (ZyPREXA) tablet 20 mg  20 mg Oral QHS    clonazePAM (KlonoPIN) tablet 1 mg  1 mg Oral BID          ASSESSMENT & PLAN     DIAGNOSES REQUIRING ACTIVE TREATMENT AND MONITORING: (reviewed/updated 7/30/2017)  Patient Active Hospital Problem List:   Schizoaffective disorder, chronic condition with acute exacerbation ([facility].) (7/22/2017)      Assessment: minimal change-remain labile, verbal, irritable, and delusional    Plan: I will ct to adjust med- Zyprexa, Depakote,Klonopin. Noncompliance with treatment (7/22/2017)    Assessment: Chronic problem due to poor insight  - court order granted    Plan: Educate about the need for medications ,consider Long Acting Antipsychotic med.      In summary, Ash Cuello, is a 30 y.o.  female who presents with a severe exacerbation of the principal diagnosis of Schizoaffective disorder, chronic condition with acute exacerbation ([facility].)     Patient's condition is not stable . Patient requires continued inpatient hospitalization for further stabilization, safety monitoring and medication management. I will continue to coordinate the provision of individual, milieu, occupational, group, and substance abuse therapies to address target symptoms/diagnoses as deemed appropriate for the individual patient. A coordinated, multidisplinary treatment team round was conducted with the patient (this team consists of the nurse, psychiatric unit pharmcist,  and writer). Complete current electronic health record for patient has been reviewed today including consultant notes, ancillary staff notes, nurses and psychiatric tech notes. Suicide risk assessment completed and patient deemed to be of low risk for suicide at this time. The following regarding medications was addressed during rounds with patient:   the risks and benefits of the proposed medication. The patient was given the opportunity to ask questions. Informed consent given to the use of the above medications. Will continue to adjust psychiatric and non-psychiatric medications (see above \"medication\" section and orders section for details) as deemed appropriate & based upon diagnoses and response to treatment.      I will continue to order blood tests/labs and diagnostic tests as deemed appropriate and review results as they become available (see orders for details and above listed lab/test results). I will order psychiatric records from previous [facility] hospitals to further elucidate the nature of patient's psychopathology and review once available. I will gather additional collateral information from friends, family and o/p treatment team to further elucidate the nature of patient's psychopathology and baselline level of psychiatric functioning. I certify that this patient's inpatient psychiatric hospital services furnished since the previous certification were, and continue to be, required for treatment that could reasonably be expected to improve the patient's condition, or for diagnostic study, and that the patient continues to need, on a daily basis, active treatment furnished directly by or requiring the supervision of inpatient psychiatric facility personnel. In addition, the hospital records show that services furnished were intensive treatment services, admission or related services, or equivalent services.     EXPECTED DISCHARGE DATE/DAY: TBD     DISPOSITION:    Home       Signed By:   Huy Contreras MD  7/30/2017

## 2017-07-30 NOTE — PROGRESS NOTES
Problem: Altered Thought Process (Adult/Pediatric)  Goal: *STG: Complies with medication therapy  Outcome: Progressing Towards Goal  Patient has been med and meal compliant. Remains hyper verbal and manic. Loud and entitled most of shift. Needing lots of redirection and limit setting. Has no insight into her repetitive behavior. Denies SI,Hi,A/V Hallucinations. Will continue to monitor patient and behavior.

## 2017-07-30 NOTE — BH NOTES
Pt medication and meals complaint, Pt was received up, alert and visible on the unit, attend to groups, Pt continues to be hyper verbal, Pt demanding and threatening towards peers, Pt continues to ask staff about the phone and staff remind her she can use the phone every hour for 10 minutes, Pt became upset and Pt said she will continue to stay to her self and won't say nothing to nobody, Pt will remain on Q 15 checks for safety.

## 2017-07-31 PROCEDURE — 65220000003 HC RM SEMIPRIVATE PSYCH

## 2017-07-31 PROCEDURE — 74011250637 HC RX REV CODE- 250/637: Performed by: PSYCHIATRY & NEUROLOGY

## 2017-07-31 RX ADMIN — CLONAZEPAM 1 MG: 1 TABLET ORAL at 17:23

## 2017-07-31 RX ADMIN — DIVALPROEX SODIUM 1500 MG: 500 TABLET, FILM COATED, EXTENDED RELEASE ORAL at 21:05

## 2017-07-31 RX ADMIN — OLANZAPINE 20 MG: 10 TABLET, FILM COATED ORAL at 21:04

## 2017-07-31 RX ADMIN — CLONAZEPAM 1 MG: 1 TABLET ORAL at 08:36

## 2017-07-31 NOTE — BH NOTES
Pt continues to talk loud with an agressive/demanding tone. Pt stated she did not want to eat her lunch in the dayroom \"because I don't want to be around those people\". Pt was encouraged to eat in the dayroom and if she would like to sit alone there was an empty table where she could be alone. Pt ate her lunch in the dayroom and socialized the entire time. Pt did not express any other concerns at this time . Pt will continue to be monitored for safety.

## 2017-07-31 NOTE — BH NOTES
GROUP THERAPY PROGRESS NOTE    Lila Johnson is participating in [employer].      Group time: 30 minutes    Personal goal for participation:  Unit orientation    Goal orientation: Community    Group therapy participation: Pt did not attend    Therapeutic interventions reviewed and discussed: Yes    Impression of participation: N/A

## 2017-07-31 NOTE — BH NOTES
GROUP THERAPY PROGRESS NOTE    The patient Sheldon hartman 30 y.o. female is participating in [employer]. Group time: 1 hour    Personal goal for participation: To concentrate on selected task    Goal orientation: social    Group therapy participation: active    Therapeutic interventions reviewed and discussed: Crafts, games, music    Impression of participation: The patient was attentive.     Jerry Garvey  7/31/2017  5:29 PM

## 2017-07-31 NOTE — BH NOTES
PSYCHIATRIC PROGRESS NOTE         Patient Name  Alyson Brothers   Date of Birth 1980   [facility] [id number]   Medical Record Number  [id number]      Age  30 y.o. PCP None   Admit date:  7/21/2017    Room Number  389/24  @ [facility]   Date of Service  7/31/2017          PSYCHOTHERAPY SESSION NOTE:  Length of psychotherapy session: 15 minutes    Main condition/diagnosis/issues treated during session today, 7/31/2017 : compliance coping skills. irritability    I employed Cognitive Behavioral therapy techniques, Reality-Oriented psychotherapy, as well as supportive psychotherapy in regards to various ongoing psychosocial stressors, including the following: pre-admission and current problems; housing issues;occupational issues; academic issues; legal issues; medical issues; and stress of hospitalization. Interpersonal relationship issues and psychodynamic conflicts explored. Attempts made to alleviate maladaptive patterns. We, also, worked on issues of denial & effects of substance dependency/use     Overall, patient is minimally progressing    Treatment Plan Update (reviewed an updated 7/31/2017) : I will modify psychotherapy tx plan by implementing more stress management strategies, building upon cognitive behavioral techniques, increasing coping skills, as well as shoring up psychological defenses). An extended energy and skill set was needed to engage pt in psychotherapy due to some of the following: resistiveness, complexity, negativity, confrontational nature, hostile behaviors, and/or severe abnormalities in thought processes/psychosis resulting in the loss of expressive/receptive language communication skills. E & M PROGRESS NOTE:         HISTORY         HISTORY OF PRESENT ILLNESS/INTERVAL HISTORY:  (reviewed/updated 7/31/2017). CC: \"I am doing perfect\"  \".  Pt admitted under a [facility] halfway order (TDO)   for severe psychosis and wendy proving to be/posing an imminent danger to selfand an inability to care for self. HISTORY OF PRESENT ILLNESS:    The patient, Bettina Lord, is a 30 y.o. WHITE [place]  female with a past psychiatric history significant for Schizoaffective disorder bipolar type , who presents at this time with complaints of (and/or evidence of) the following emotional symptoms: agitation, psychotic behavior, wendy and psychosis. Additional symptomatology include agitation, difficulty sleeping and increased irritability,she was found wandering around ,confuse,disoritented,unkempt , not wearing shoes or undergarments. The above symptoms have been present for one day . These symptoms are of moderate in  severity. These symptoms are constant  in nature. The patient's condition has been precipitated by and psychosocial stressors . treatment noncompliance. UDS: negative; BAL=0. Patient reported that she has been diagnosed with Schizoaffective disorder, since last 2 years she is not taking any medications and not following any psychiatrist. She reported that she has been tried on Tegretol,depakote, lithium ,haldol ,Risperidone and Zyprexa. She only agree to take [address] presents/reports/evidences the following emotional symptoms today, 7/31/2017:agitation, delusions and wendy. The above symptoms have been present for one day . These symptoms are of moderate in severity. The symptoms are intermittent/ fleeting in nature. Additional symptomatology and features include     7/31- paranoid delusion, intrusive, argumentative towards staff and peer,hyper verbal, disorganized. , repetitive. Preoccupied with dc. SIDE EFFECTS: (reviewed/updated 7/31/2017)  None reported or admitted to.      ALLERGIES:(reviewed/updated 7/31/2017)  No Known Allergies   MEDICATIONS PRIOR TO ADMISSION:(reviewed/updated 7/31/2017)  Prescriptions Prior to Admission   Medication Sig    carBAMazepine XR (TEGRETOL XR) 100 mg SR tablet Take 100 mg by mouth two (2) times a day. Indications: Ana associated with Bipolar Disorder, MIXED BIPOLAR I DISORDER      PAST MEDICAL HISTORY: Past medical history from the initial psychiatric evaluation has been reviewed (reviewed/updated 7/31/2017) with no additional updates (I asked patient and no additional past medical history provided). Past Medical History:   Diagnosis Date    Anxiety disorder     Depression     Hypertension     Psychotic disorder      Past Surgical History:   Procedure Laterality Date    HX GYN        SOCIAL HISTORY: Social history from the initial psychiatric evaluation has been reviewed (reviewed/updated 7/31/2017) with no additional updates (I asked patient and no additional social history provided). Social History     Social History    Marital status: SINGLE     Spouse name: N/A    Number of children: N/A    Years of education: N/A     Occupational History    Not on file. Social History Main Topics    Smoking status: Never Smoker    Smokeless tobacco: Never Used    Alcohol use No    Drug use: No    Sexual activity: Yes     Other Topics Concern    Not on file     Social History Narrative    No narrative on file      FAMILY HISTORY: Family history from the initial psychiatric evaluation has been reviewed (reviewed/updated 7/31/2017) with no additional updates (I asked patient and no additional family history provided). History reviewed. No pertinent family history.     REVIEW OF SYSTEMS: (reviewed/updated 7/31/2017)  Appetite:improved   Sleep: improved   All other Review of Systems: Psychological ROS: positive for - hostility, irritability and mood swings  Respiratory ROS: no cough, shortness of breath, or wheezing  Cardiovascular ROS: no chest pain or dyspnea on exertion         [facility] ([facility] [place]    MSE FINDINGS ARE WITHIN NORMAL LIMITS (WNL) UNLESS OTHERWISE STATED BELOW. ( ALL OF THE BELOW CATEGORIES OF THE MSE HAVE BEEN REVIEWED (reviewed 7/31/2017) AND UPDATED AS DEEMED APPROPRIATE )  General Presentation age appropriate and casually dressed,  unco operative, unreliable   Orientation oriented to time, place and person   Vital Signs  See below (reviewed 7/31/2017); Vital Signs (BP, Pulse, & Temp) are within normal limits if not listed below. Gait and Station Stable/steady, no ataxia   Musculoskeletal System No extrapyramidal symptoms (EPS); no abnormal muscular movements or Tardive Dyskinesia (TD); muscle strength and tone are within normal limits   Language No aphasia or dysarthria   Speech:  hyperverbal, Pressured   Thought Processes illogical; fast rate of thoughts; poor abstract reasoning/computation   Thought Associations tangential   Thought Content Paranoid delusion, free of hallucination   Suicidal Ideations no plan  and no intention   Homicidal Ideations none   Mood:  anxious , irritable and labile ,   Affect:  Labile, hostile, mood incongruent   Memory recent  intact   Memory remote:  intact   Concentration/Attention:  intact   Fund of Knowledge average   Insight:  limited   Reliability poor   Judgment:  poor          VITALS:     Patient Vitals for the past 24 hrs:   Temp Pulse Resp BP   07/30/17 1200 97.4 °F (36.3 °C) 75 18 103/70     Wt Readings from Last 3 Encounters:   07/21/17 68 kg (150 lb)     Temp Readings from Last 3 Encounters:   07/30/17 97.4 °F (36.3 °C)     BP Readings from Last 3 Encounters:   07/30/17 103/70     Pulse Readings from Last 3 Encounters:   07/30/17 75            DATA     LABORATORY DATA:(reviewed/updated 7/31/2017)  No results found for this or any previous visit (from the past 24 hour(s)). No results found for: VALF2, VALAC, VALP, VALPR, DS6, CRBAM, CRBAMP, CARB2, XCRBAM  No results found for: LITHM   RADIOLOGY REPORTS:(reviewed/updated 7/31/2017)  No results found.        MEDICATIONS     ALL MEDICATIONS:   Current Facility-Administered Medications   Medication Dose Route Frequency  divalproex ER (DEPAKOTE ER) 24 hour tablet 1,500 mg  1,500 mg Oral QHS    Or    LORazepam (ATIVAN) injection 1 mg +++COURT ORDERED MEDICATION FOR REFUSAL OF PO DEPAKOTE+++  1 mg IntraMUSCular QHS    OLANZapine (ZyPREXA) tablet 20 mg  20 mg Oral QHS    clonazePAM (KlonoPIN) tablet 1 mg  1 mg Oral BID    ziprasidone (GEODON) 20 mg in sterile water (preservative free) 1 mL injection  20 mg IntraMUSCular BID PRN    OLANZapine (ZyPREXA) tablet 5 mg  5 mg Oral Q6H PRN    benztropine (COGENTIN) tablet 2 mg  2 mg Oral BID PRN    benztropine (COGENTIN) injection 2 mg  2 mg IntraMUSCular BID PRN    LORazepam (ATIVAN) injection 2 mg  2 mg IntraMUSCular Q4H PRN    LORazepam (ATIVAN) tablet 1 mg  1 mg Oral Q4H PRN    zolpidem (AMBIEN) tablet 10 mg  10 mg Oral QHS PRN    acetaminophen (TYLENOL) tablet 650 mg  650 mg Oral Q4H PRN    ibuprofen (MOTRIN) tablet 400 mg  400 mg Oral Q8H PRN    magnesium hydroxide (MILK OF MAGNESIA) 400 mg/5 mL oral suspension 30 mL  30 mL Oral DAILY PRN    nicotine (NICODERM CQ) 21 mg/24 hr patch 1 Patch  1 Patch TransDERmal DAILY PRN      SCHEDULED MEDICATIONS:   Current Facility-Administered Medications   Medication Dose Route Frequency    divalproex ER (DEPAKOTE ER) 24 hour tablet 1,500 mg  1,500 mg Oral QHS    Or    LORazepam (ATIVAN) injection 1 mg +++COURT ORDERED MEDICATION FOR REFUSAL OF PO DEPAKOTE+++  1 mg IntraMUSCular QHS    OLANZapine (ZyPREXA) tablet 20 mg  20 mg Oral QHS    clonazePAM (KlonoPIN) tablet 1 mg  1 mg Oral BID          ASSESSMENT & PLAN     DIAGNOSES REQUIRING ACTIVE TREATMENT AND MONITORING: (reviewed/updated 7/31/2017)  Patient Active Hospital Problem List:   Schizoaffective disorder, chronic condition with acute exacerbation ([facility] [address] (7/22/2017)      Assessment: minimal change- intrusive, labile, psychotic    Plan: I will ct to adjust med- Zyprexa, Depakote,Klonopin.  Depakote level due on Tue     Noncompliance with treatment (7/22/2017)    Assessment: due to psychosis- court order med- now accepting med    Plan: Educate about the need for medications ,consider Long Acting Antipsychotic med.      In summary, Jami Monterroso, is a 30 y.o.  female who presents with a severe exacerbation of the principal diagnosis of Schizoaffective disorder, chronic condition with acute exacerbation ([facility] [address]     Patient's condition is not stable . Patient requires continued inpatient hospitalization for further stabilization, safety monitoring and medication management. I will continue to coordinate the provision of individual, milieu, occupational, group, and substance abuse therapies to address target symptoms/diagnoses as deemed appropriate for the individual patient. A coordinated, multidisplinary treatment team round was conducted with the patient (this team consists of the nurse, psychiatric unit pharmcist,  and writer). Complete current electronic health record for patient has been reviewed today including consultant notes, ancillary staff notes, nurses and psychiatric tech notes. Suicide risk assessment completed and patient deemed to be of low risk for suicide at this time. The following regarding medications was addressed during rounds with patient:   the risks and benefits of the proposed medication. The patient was given the opportunity to ask questions. Informed consent given to the use of the above medications. Will continue to adjust psychiatric and non-psychiatric medications (see above \"medication\" section and orders section for details) as deemed appropriate & based upon diagnoses and response to treatment. I will continue to order blood tests/labs and diagnostic tests as deemed appropriate and review results as they become available (see orders for details and above listed lab/test results).     I will order psychiatric records from previous [facility] hospitals to further elucidate the nature of patient's psychopathology and review once available. I will gather additional collateral information from friends, family and o/p treatment team to further elucidate the nature of patient's psychopathology and baselline level of psychiatric functioning. I certify that this patient's inpatient psychiatric hospital services furnished since the previous certification were, and continue to be, required for treatment that could reasonably be expected to improve the patient's condition, or for diagnostic study, and that the patient continues to need, on a daily basis, active treatment furnished directly by or requiring the supervision of inpatient psychiatric facility personnel. In addition, the hospital records show that services furnished were intensive treatment services, admission or related services, or equivalent services.     EXPECTED DISCHARGE DATE/DAY: TBD     DISPOSITION:    Home       Signed By:   Olivier Royal MD  7/31/2017

## 2017-07-31 NOTE — PROGRESS NOTES
Diet as tolerated. Pt noted to be meal compliant. PMH uremarkable per nutrition. Ht: 5'3\"  Wt: 150 lb  BMI 26.57 kg/(m^2) c/w overweight  Est energy needs: 1675 kcal, 62 g protein, 1 mL/kcal fluids  Pt will consume > 75% of meals at follow up.

## 2017-08-01 LAB — VALPROATE SERPL-MCNC: 97 UG/ML (ref 50–100)

## 2017-08-01 PROCEDURE — 80164 ASSAY DIPROPYLACETIC ACD TOT: CPT | Performed by: PSYCHIATRY & NEUROLOGY

## 2017-08-01 PROCEDURE — 65220000003 HC RM SEMIPRIVATE PSYCH

## 2017-08-01 PROCEDURE — 74011250637 HC RX REV CODE- 250/637: Performed by: PSYCHIATRY & NEUROLOGY

## 2017-08-01 RX ADMIN — OLANZAPINE 5 MG: 5 TABLET, FILM COATED ORAL at 12:19

## 2017-08-01 RX ADMIN — CLONAZEPAM 1 MG: 1 TABLET ORAL at 08:27

## 2017-08-01 RX ADMIN — DIVALPROEX SODIUM 1500 MG: 500 TABLET, FILM COATED, EXTENDED RELEASE ORAL at 21:26

## 2017-08-01 RX ADMIN — OLANZAPINE 20 MG: 10 TABLET, FILM COATED ORAL at 21:26

## 2017-08-01 RX ADMIN — LORAZEPAM 1 MG: 1 TABLET ORAL at 12:19

## 2017-08-01 RX ADMIN — CLONAZEPAM 1 MG: 1 TABLET ORAL at 16:21

## 2017-08-01 NOTE — BH NOTES
PSYCHIATRIC PROGRESS NOTE         Patient Name  Meme March   Date of Birth 1980   [facility] [id number]   Medical Record Number  [id number]      Age  30 y.o. PCP None   Admit date:  7/21/2017    Room Number  204/94  @ [facility]   Date of Service  8/1/2017          PSYCHOTHERAPY SESSION NOTE:  Length of psychotherapy session: 15 minutes    Main condition/diagnosis/issues treated during session today, 8/1/2017 : compliance coping skills. irritability    I employed Cognitive Behavioral therapy techniques, Reality-Oriented psychotherapy, as well as supportive psychotherapy in regards to various ongoing psychosocial stressors, including the following: pre-admission and current problems; housing issues;occupational issues; academic issues; legal issues; medical issues; and stress of hospitalization. Interpersonal relationship issues and psychodynamic conflicts explored. Attempts made to alleviate maladaptive patterns. We, also, worked on issues of denial & effects of substance dependency/use     Overall, patient is minimally progressing    Treatment Plan Update (reviewed an updated 8/1/2017) : I will modify psychotherapy tx plan by implementing more stress management strategies, building upon cognitive behavioral techniques, increasing coping skills, as well as shoring up psychological defenses). An extended energy and skill set was needed to engage pt in psychotherapy due to some of the following: resistiveness, complexity, negativity, confrontational nature, hostile behaviors, and/or severe abnormalities in thought processes/psychosis resulting in the loss of expressive/receptive language communication skills. E & M PROGRESS NOTE:         HISTORY         HISTORY OF PRESENT ILLNESS/INTERVAL HISTORY:  (reviewed/updated 8/1/2017). CC: \"I am doing perfect\"  \".  Pt admitted under a [facility] skilled nursing order (TDO)   for severe psychosis and wendy proving to be/posing an imminent danger to selfand an inability to care for self. HISTORY OF PRESENT ILLNESS:    The patient, Karena Carlisle, is a 30 y.o. WHITE [place]  female with a past psychiatric history significant for Schizoaffective disorder bipolar type , who presents at this time with complaints of (and/or evidence of) the following emotional symptoms: agitation, psychotic behavior, wendy and psychosis. Additional symptomatology include agitation, difficulty sleeping and increased irritability,she was found wandering around ,confuse,disoritented,unkempt , not wearing shoes or undergarments. The above symptoms have been present for one day . These symptoms are of moderate in  severity. These symptoms are constant  in nature. The patient's condition has been precipitated by and psychosocial stressors . treatment noncompliance. UDS: negative; BAL=0. Patient reported that she has been diagnosed with Schizoaffective disorder, since last 2 years she is not taking any medications and not following any psychiatrist. She reported that she has been tried on Tegretol,depakote, lithium ,haldol ,Risperidone and Zyprexa. She only agree to take [address] presents/reports/evidences the following emotional symptoms today, 8/1/2017:agitation, delusions and wendy. The above symptoms have been present for one day . These symptoms are of moderate in severity. The symptoms are intermittent/ fleeting in nature. Additional symptomatology and features include     7/31- paranoid delusion, intrusive, argumentative towards staff and peer,hyper verbal, disorganized. , repetitive. Preoccupied with dc.  8/1/17 She slept well last night and she is  Compliant with medications and she still argumentative and she denied  Feeling paranoid and has better insight       SIDE EFFECTS: (reviewed/updated 8/1/2017)  None reported or admitted to.      ALLERGIES:(reviewed/updated 8/1/2017)  No Known Allergies MEDICATIONS PRIOR TO ADMISSION:(reviewed/updated 8/1/2017)  Prescriptions Prior to Admission   Medication Sig    carBAMazepine XR (TEGRETOL XR) 100 mg SR tablet Take 100 mg by mouth two (2) times a day. Indications: Ana associated with Bipolar Disorder, MIXED BIPOLAR I DISORDER      PAST MEDICAL HISTORY: Past medical history from the initial psychiatric evaluation has been reviewed (reviewed/updated 8/1/2017) with no additional updates (I asked patient and no additional past medical history provided). Past Medical History:   Diagnosis Date    Anxiety disorder     Depression     Hypertension     Psychotic disorder      Past Surgical History:   Procedure Laterality Date    HX GYN        SOCIAL HISTORY: Social history from the initial psychiatric evaluation has been reviewed (reviewed/updated 8/1/2017) with no additional updates (I asked patient and no additional social history provided). Social History     Social History    Marital status: SINGLE     Spouse name: N/A    Number of children: N/A    Years of education: N/A     Occupational History    Not on file. Social History Main Topics    Smoking status: Never Smoker    Smokeless tobacco: Never Used    Alcohol use No    Drug use: No    Sexual activity: Yes     Other Topics Concern    Not on file     Social History Narrative    No narrative on file      FAMILY HISTORY: Family history from the initial psychiatric evaluation has been reviewed (reviewed/updated 8/1/2017) with no additional updates (I asked patient and no additional family history provided). History reviewed. No pertinent family history.     REVIEW OF SYSTEMS: (reviewed/updated 8/1/2017)  Appetite:improved   Sleep: improved   All other Review of Systems: Psychological ROS: positive for - hostility, irritability and mood swings  Respiratory ROS: no cough, shortness of breath, or wheezing  Cardiovascular ROS: no chest pain or dyspnea on exertion         MENTAL STATUS EXAM & VITALS     MENTAL STATUS EXAM (MSE):    MSE FINDINGS ARE WITHIN NORMAL LIMITS (WNL) UNLESS OTHERWISE STATED BELOW. ( ALL OF THE BELOW CATEGORIES OF THE MSE HAVE BEEN REVIEWED (reviewed 8/1/2017) AND UPDATED AS DEEMED APPROPRIATE )  General Presentation age appropriate and casually dressed,  unco operative, unreliable   Orientation oriented to time, place and person   Vital Signs  See below (reviewed 8/1/2017); Vital Signs (BP, Pulse, & Temp) are within normal limits if not listed below.    Gait and Station Stable/steady, no ataxia   Musculoskeletal System No extrapyramidal symptoms (EPS); no abnormal muscular movements or Tardive Dyskinesia (TD); muscle strength and tone are within normal limits   Language No aphasia or dysarthria   Speech:  hyperverbal, Pressured   Thought Processes illogical; fast rate of thoughts; poor abstract reasoning/computation   Thought Associations tangential   Thought Content Paranoid delusion, free of hallucination   Suicidal Ideations no plan  and no intention   Homicidal Ideations none   Mood:   Irritable ,   Affect:  Labile, hostile, mood incongruent   Memory recent  intact   Memory remote:  intact   Concentration/Attention:  intact   Fund of Knowledge average   Insight:  limited   Reliability poor   Judgment:  poor          VITALS:     Patient Vitals for the past 24 hrs:   Temp Pulse Resp BP   08/01/17 1318 - 83 18 158/89   08/01/17 1314 99 °F (37.2 °C) (!) 102 18 131/82   08/01/17 0548 - 93 16 (!) 105/93     Wt Readings from Last 3 Encounters:   07/21/17 68 kg (150 lb)     Temp Readings from Last 3 Encounters:   08/01/17 99 °F (37.2 °C)     BP Readings from Last 3 Encounters:   08/01/17 158/89     Pulse Readings from Last 3 Encounters:   08/01/17 83            DATA     LABORATORY DATA:(reviewed/updated 8/1/2017)  Recent Results (from the past 24 hour(s))   VALPROIC ACID    Collection Time: 08/01/17  4:46 AM   Result Value Ref Range    Valproic acid 97 50 - 100 ug/ml     Lab Results Component Value Date/Time    Valproic acid 97 08/01/2017 04:46 AM     No results found for: LITHM   RADIOLOGY REPORTS:(reviewed/updated 8/1/2017)  No results found.        MEDICATIONS     ALL MEDICATIONS:   Current Facility-Administered Medications   Medication Dose Route Frequency    divalproex ER (DEPAKOTE ER) 24 hour tablet 1,500 mg  1,500 mg Oral QHS    Or    LORazepam (ATIVAN) injection 1 mg +++COURT ORDERED MEDICATION FOR REFUSAL OF PO DEPAKOTE+++  1 mg IntraMUSCular QHS    OLANZapine (ZyPREXA) tablet 20 mg  20 mg Oral QHS    clonazePAM (KlonoPIN) tablet 1 mg  1 mg Oral BID    ziprasidone (GEODON) 20 mg in sterile water (preservative free) 1 mL injection  20 mg IntraMUSCular BID PRN    OLANZapine (ZyPREXA) tablet 5 mg  5 mg Oral Q6H PRN    benztropine (COGENTIN) tablet 2 mg  2 mg Oral BID PRN    benztropine (COGENTIN) injection 2 mg  2 mg IntraMUSCular BID PRN    LORazepam (ATIVAN) injection 2 mg  2 mg IntraMUSCular Q4H PRN    LORazepam (ATIVAN) tablet 1 mg  1 mg Oral Q4H PRN    zolpidem (AMBIEN) tablet 10 mg  10 mg Oral QHS PRN    acetaminophen (TYLENOL) tablet 650 mg  650 mg Oral Q4H PRN    ibuprofen (MOTRIN) tablet 400 mg  400 mg Oral Q8H PRN    magnesium hydroxide (MILK OF MAGNESIA) 400 mg/5 mL oral suspension 30 mL  30 mL Oral DAILY PRN    nicotine (NICODERM CQ) 21 mg/24 hr patch 1 Patch  1 Patch TransDERmal DAILY PRN      SCHEDULED MEDICATIONS:   Current Facility-Administered Medications   Medication Dose Route Frequency    divalproex ER (DEPAKOTE ER) 24 hour tablet 1,500 mg  1,500 mg Oral QHS    Or    LORazepam (ATIVAN) injection 1 mg +++COURT ORDERED MEDICATION FOR REFUSAL OF PO DEPAKOTE+++  1 mg IntraMUSCular QHS    OLANZapine (ZyPREXA) tablet 20 mg  20 mg Oral QHS    clonazePAM (KlonoPIN) tablet 1 mg  1 mg Oral BID          ASSESSMENT & PLAN     DIAGNOSES REQUIRING ACTIVE TREATMENT AND MONITORING: (reviewed/updated 8/1/2017)  Patient Active Hospital Problem List:   Schizoaffective disorder, chronic condition with acute exacerbation ([facility] [address] [address] (7/22/2017)      Assessment: minimal change- intrusive, labile, psychotic    Plan: I will ct to adjust med- Zyprexa, Depakote,Klonopin. Depakote level due on Tue     Noncompliance with treatment (7/22/2017)    Assessment: due to psychosis- court order med- now accepting med    Plan: Educate about the need for medications ,consider Long Acting Antipsychotic med. 8/1/17 will continue same treatment plan      In summary, eMme March, is a 30 y.o.  female who presents with a severe exacerbation of the principal diagnosis of Schizoaffective disorder, chronic condition with acute exacerbation ([facility] [address]     Patient's condition is not stable . Patient requires continued inpatient hospitalization for further stabilization, safety monitoring and medication management. I will continue to coordinate the provision of individual, milieu, occupational, group, and substance abuse therapies to address target symptoms/diagnoses as deemed appropriate for the individual patient. A coordinated, multidisplinary treatment team round was conducted with the patient (this team consists of the nurse, psychiatric unit pharmcist,  and writer). Complete current electronic health record for patient has been reviewed today including consultant notes, ancillary staff notes, nurses and psychiatric tech notes. Suicide risk assessment completed and patient deemed to be of low risk for suicide at this time. The following regarding medications was addressed during rounds with patient:   the risks and benefits of the proposed medication. The patient was given the opportunity to ask questions. Informed consent given to the use of the above medications. Will continue to adjust psychiatric and non-psychiatric medications (see above \"medication\" section and orders section for details) as deemed appropriate & based upon diagnoses and response to treatment.      I will continue to order blood tests/labs and diagnostic tests as deemed appropriate and review results as they become available (see orders for details and above listed lab/test results). I will order psychiatric records from previous [facility] hospitals to further elucidate the nature of patient's psychopathology and review once available. I will gather additional collateral information from friends, family and o/p treatment team to further elucidate the nature of patient's psychopathology and baselline level of psychiatric functioning. I certify that this patient's inpatient psychiatric hospital services furnished since the previous certification were, and continue to be, required for treatment that could reasonably be expected to improve the patient's condition, or for diagnostic study, and that the patient continues to need, on a daily basis, active treatment furnished directly by or requiring the supervision of inpatient psychiatric facility personnel. In addition, the hospital records show that services furnished were intensive treatment services, admission or related services, or equivalent services.     EXPECTED DISCHARGE DATE/DAY: TBD     DISPOSITION:    Home       Signed By:   Mela Flores MD  8/1/2017

## 2017-08-01 NOTE — BH NOTES
GROUP THERAPY PROGRESS NOTE    The patient Korin Mayorgaing a 30 y.o. female is participating in Coping Skills Group. Group time: 45 minutes    Personal goal for participation:  To participate in mental health journey game    Goal orientation:  personal    Group therapy participation: active    Therapeutic interventions reviewed and discussed: choices in recovery    Impression of participation:  The patient was attentive-required prompting    Maryl Severe  8/1/2017  2:06 PM

## 2017-08-01 NOTE — BH NOTES
GROUP THERAPY PROGRESS NOTE    The patient [place]  a 30 y.o. female is participating in Creative Expression Group. Group time: 1 hour    Personal goal for participation: To concentrate on selected task    Goal orientation: social    Group therapy participation: active    Therapeutic interventions reviewed and discussed: Crafts, games, music    Impression of participation: The patient was attentive.     Hanna Thomas  8/1/2017  5:07 PM

## 2017-08-01 NOTE — BH NOTES
Social Work:      Ms. Jessa Sotomayor remains loud, demanding and labile. Pt has met with treatment team daily and is aware she needs 3 consecutive days of appropriate behavior and regular group attendance. This worker has been in contact with pt's fiance who continues to ask when pt will be discharged. Pt and fiance have not been given specific dates.

## 2017-08-01 NOTE — BH NOTES
GROUP THERAPY PROGRESS NOTE    The patient Dereje Fleming is participating in [employer]. Group time: 30 minutes    Personal goal for participation: to orient the patient to the unit.     Goal orientation: successful adoption of unit rules    Group therapy participation: minimal    Therapeutic interventions reviewed and discussed: Yes    Impression of participation:fidel Avila  8/1/2017 11:51 AM

## 2017-08-01 NOTE — PROGRESS NOTES
Problem: Manic Behavior (Adult/Pediatric)  Goal: *LTG: Verbalizes insights regarding recovery  Outcome: Progressing Towards Goal  Patient remains manic and loud. Hyper verbal. Patient is attending groups and visible on the unit. Patient did receive PRN's today. Patient requires lots of redirection. Patient continues to be demanding and intrusive. Will continue to monitor patient and assess needs.

## 2017-08-01 NOTE — BH NOTES
GROUP THERAPY PROGRESS NOTE    Korin Vazquez is participating in Coping Skills Group. Group time: 25 minutes    Personal goal for participation: Education    Goal orientation: personal    Group therapy participation: disruptive    Therapeutic interventions reviewed and discussed: Discussed various positive coping skills. Impression of participation: Poor- Pt had difficulty following directions and argued with another pt who was attending group.

## 2017-08-01 NOTE — PROGRESS NOTES
Laboratory Monitoring for Valproic Acid    This patient is currently prescribed the following medication(s):   Current Facility-Administered Medications   Medication Dose Route Frequency    divalproex ER (DEPAKOTE ER) 24 hour tablet 1,500 mg  1,500 mg Oral QHS    Or    LORazepam (ATIVAN) injection 1 mg +++COURT ORDERED MEDICATION FOR REFUSAL OF PO DEPAKOTE+++  1 mg IntraMUSCular QHS    OLANZapine (ZyPREXA) tablet 20 mg  20 mg Oral QHS    clonazePAM (KlonoPIN) tablet 1 mg  1 mg Oral BID       The following labs have been completed for monitoring of valproic acid:    Valproic Acid Serum Concentration  Lab Results   Component Value Date/Time    Valproic acid 97 08/01/2017 04:46 AM         Assessment/Plan:  Valproic acid level within therapeutic limits, 97 mcg/mL. Level drawn appropriately and at steady-state. Same dose continued.       Jeaneth Moreno, PharmD, BCPS  [phone]

## 2017-08-01 NOTE — BH NOTES
Client loud intrusive at times focus on phones much of tour,insight loose, scattered as to treatment goals`and participation. Clt.continue to need limited setting on loud conversation on phone and general social interaction. Will continue Q15min. Safety monitoring and encourage medication and meal compliance goals.

## 2017-08-01 NOTE — PROGRESS NOTES
Problem: Altered Thought Process (Adult/Pediatric)  Goal: *STG: Remains safe in hospital  Outcome: Progressing Towards Goal  Patient remains loud, intrusive, hyperverbal, manic. Somatic and disorganized. Repeatedly asked staff for juice, stating that she is hypoglycemic. Given medication education at patient's request. Needs constant redirection. Denies pain/discomfort. Denies SI/HI. Medication and meal compliant. Participatory in groups/activities. Continues on Q15 min safety checks. Will continue to monitor and continue plan of care.

## 2017-08-01 NOTE — PROGRESS NOTES
Problem: Altered Thought Process (Adult/Pediatric)  Goal: *STG: Remains safe in hospital  Client in fairly good spirits, loud, talks constantly, more concerned with talking to boyfriend than anything else here. Is in and out of her room. Boyfriend visited, brought 2 dresses, client to keep one to use as a top over her pants. Still needs lots of redirection. Meal and med compliant, q 15 min checks for safety continue.

## 2017-08-02 PROCEDURE — 74011250637 HC RX REV CODE- 250/637: Performed by: PSYCHIATRY & NEUROLOGY

## 2017-08-02 PROCEDURE — 65220000003 HC RM SEMIPRIVATE PSYCH

## 2017-08-02 RX ORDER — OLANZAPINE 5 MG/1
5 TABLET ORAL DAILY
Status: DISCONTINUED | OUTPATIENT
Start: 2017-08-02 | End: 2017-08-02

## 2017-08-02 RX ADMIN — OLANZAPINE 25 MG: 10 TABLET, FILM COATED ORAL at 21:30

## 2017-08-02 RX ADMIN — CLONAZEPAM 1 MG: 1 TABLET ORAL at 17:38

## 2017-08-02 RX ADMIN — DIVALPROEX SODIUM 1500 MG: 500 TABLET, FILM COATED, EXTENDED RELEASE ORAL at 21:30

## 2017-08-02 RX ADMIN — CLONAZEPAM 1 MG: 1 TABLET ORAL at 09:13

## 2017-08-02 NOTE — BH NOTES
Social Work Discharge Plan:      Ms. Jean-Claude Michaels will be discharged home on 8/10/17 to [address] [place] [address] Drive, [facility]. Pt will be transported by her fiance around 11:00AM. Pt is calm and cooperative. Pt presented with a full affect and euthymic mood. Pt's in agreement with plan. Follow up appointments:    Vidal Álvarez [address] [place] [address]  [phone]   Fax: [phone]  Monday, August 14th at 9:15 AM intake appointment to begin process for  and psychiatric medication management. [address]  Phone: [phone]   Wednesday, August 16th at 12:00PM for an assessment with Denny Schwarz for mental health skill building services.

## 2017-08-02 NOTE — BH NOTES
GROUP THERAPY PROGRESS NOTE    The patient Jacki Hensley a 30 y.o. female is participating in Coping Skills Group. Group time: 45 minutes    Personal goal for participation:  To practice assertiveness skills    Goal orientation:  personal    Group therapy participation: minimal    Therapeutic interventions reviewed and discussed: life scenarios    Impression of participation:  The patient was present-left early    Vicky Santiago  8/2/2017  2:02 PM

## 2017-08-02 NOTE — BH NOTES
GROUP THERAPY PROGRESS NOTE    The patient Gemma hartman 30 y.o. female is participating in Reflections Group    Group time: 30 minutes    Personal goal for participation: To discuss the daily goals    Goal orientation: personal    Group therapy participation: minimal    Therapeutic interventions reviewed and discussed:  Yes    Impression of participation: [employer]  8/1/2017  10:13 PM

## 2017-08-02 NOTE — BH NOTES
PSYCHIATRIC PROGRESS NOTE         Patient Name  Mariaelena Quiles   Date of Birth 1980   [facility] [id number]   Medical Record Number  [id number]      Age  30 y.o. PCP None   Admit date:  7/21/2017    Room Number  757/73  @ [facility]   Date of Service  8/2/2017          PSYCHOTHERAPY SESSION NOTE:  Length of psychotherapy session: 15 minutes    Main condition/diagnosis/issues treated during session today, 8/2/2017 : compliance coping skills. irritability    I employed Cognitive Behavioral therapy techniques, Reality-Oriented psychotherapy, as well as supportive psychotherapy in regards to various ongoing psychosocial stressors, including the following: pre-admission and current problems; housing issues;occupational issues; academic issues; legal issues; medical issues; and stress of hospitalization. Interpersonal relationship issues and psychodynamic conflicts explored. Attempts made to alleviate maladaptive patterns. We, also, worked on issues of denial & effects of substance dependency/use     Overall, patient is minimally progressing    Treatment Plan Update (reviewed an updated 8/2/2017) : I will modify psychotherapy tx plan by implementing more stress management strategies, building upon cognitive behavioral techniques, increasing coping skills, as well as shoring up psychological defenses). An extended energy and skill set was needed to engage pt in psychotherapy due to some of the following: resistiveness, complexity, negativity, confrontational nature, hostile behaviors, and/or severe abnormalities in thought processes/psychosis resulting in the loss of expressive/receptive language communication skills. E & M PROGRESS NOTE:         HISTORY         HISTORY OF PRESENT ILLNESS/INTERVAL HISTORY:  (reviewed/updated 8/2/2017). CC: \"I am doing perfect\"  \".  Pt admitted under a [facility] long-term order (TDO)   for severe psychosis and wendy proving to be/posing an imminent danger to selfand an inability to care for self. HISTORY OF PRESENT ILLNESS:    The patient, Michelle , is a 30 y.o. WHITE [place]  female with a past psychiatric history significant for Schizoaffective disorder bipolar type , who presents at this time with complaints of (and/or evidence of) the following emotional symptoms: agitation, psychotic behavior, wendy and psychosis. Additional symptomatology include agitation, difficulty sleeping and increased irritability,she was found wandering around ,confuse,disoritented,unkempt , not wearing shoes or undergarments. The above symptoms have been present for one day . These symptoms are of moderate in  severity. These symptoms are constant  in nature. The patient's condition has been precipitated by and psychosocial stressors . treatment noncompliance. UDS: negative; BAL=0. Patient reported that she has been diagnosed with Schizoaffective disorder, since last 2 years she is not taking any medications and not following any psychiatrist. She reported that she has been tried on Tegretol,depakote, lithium ,haldol ,Risperidone and Zyprexa. She only agree to take [address] presents/reports/evidences the following emotional symptoms today, 8/2/2017:agitation, delusions and wendy. The above symptoms have been present for one day . These symptoms are of moderate in severity. The symptoms are intermittent/ fleeting in nature. Additional symptomatology and features include     7/31- paranoid delusion, intrusive, argumentative towards staff and peer,hyper verbal, disorganized. , repetitive.  Preoccupied with dc.  8/1/17 She slept well last night and she is  Compliant with medications and she still argumentative and she denied  Feeling paranoid and has better insight   8/2/17 she still requesting to leave and she is not feeling she needs to stay and she feels ready and she wants her boyfriend to come to get her , she is compliant with medications and she is less argumentative and not getting angry easily like before but still has pressured speech and denied self harm behavior and denied hearing voices        SIDE EFFECTS: (reviewed/updated 8/2/2017)  None reported or admitted to. ALLERGIES:(reviewed/updated 8/2/2017)  No Known Allergies   MEDICATIONS PRIOR TO ADMISSION:(reviewed/updated 8/2/2017)  Prescriptions Prior to Admission   Medication Sig    carBAMazepine XR (TEGRETOL XR) 100 mg SR tablet Take 100 mg by mouth two (2) times a day. Indications: Ana associated with Bipolar Disorder, MIXED BIPOLAR I DISORDER      PAST MEDICAL HISTORY: Past medical history from the initial psychiatric evaluation has been reviewed (reviewed/updated 8/2/2017) with no additional updates (I asked patient and no additional past medical history provided). Past Medical History:   Diagnosis Date    Anxiety disorder     Depression     Hypertension     Psychotic disorder      Past Surgical History:   Procedure Laterality Date    HX GYN        SOCIAL HISTORY: Social history from the initial psychiatric evaluation has been reviewed (reviewed/updated 8/2/2017) with no additional updates (I asked patient and no additional social history provided). Social History     Social History    Marital status: SINGLE     Spouse name: N/A    Number of children: N/A    Years of education: N/A     Occupational History    Not on file. Social History Main Topics    Smoking status: Never Smoker    Smokeless tobacco: Never Used    Alcohol use No    Drug use: No    Sexual activity: Yes     Other Topics Concern    Not on file     Social History Narrative    No narrative on file      FAMILY HISTORY: Family history from the initial psychiatric evaluation has been reviewed (reviewed/updated 8/2/2017) with no additional updates (I asked patient and no additional family history provided). History reviewed. No pertinent family history. REVIEW OF SYSTEMS: (reviewed/updated 8/2/2017)  Appetite:improved   Sleep: improved   All other Review of Systems: Psychological ROS: positive for - hostility, irritability and mood swings  Respiratory ROS: no cough, shortness of breath, or wheezing  Cardiovascular ROS: no chest pain or dyspnea on exertion         [address] (MSE):    MSE FINDINGS ARE WITHIN NORMAL LIMITS (WNL) UNLESS OTHERWISE STATED BELOW. ( ALL OF THE BELOW CATEGORIES OF THE MSE HAVE BEEN REVIEWED (reviewed 8/2/2017) AND UPDATED AS DEEMED APPROPRIATE )  General Presentation age appropriate and casually dressed,  unco operative, unreliable   Orientation oriented to time, place and person   Vital Signs  See below (reviewed 8/2/2017); Vital Signs (BP, Pulse, & Temp) are within normal limits if not listed below.    Gait and Station Stable/steady, no ataxia   Musculoskeletal System No extrapyramidal symptoms (EPS); no abnormal muscular movements or Tardive Dyskinesia (TD); muscle strength and tone are within normal limits   Language No aphasia or dysarthria   Speech:  hyperverbal, Pressured   Thought Processes illogical; fast rate of thoughts; poor abstract reasoning/computation   Thought Associations tangential   Thought Content Paranoid delusion, free of hallucination   Suicidal Ideations no plan  and no intention   Homicidal Ideations none   Mood:   Irritable ,   Affect:  Labile, hostile, mood incongruent   Memory recent  intact   Memory remote:  intact   Concentration/Attention:  intact   Fund of Knowledge average   Insight:  limited   Reliability poor   Judgment:  poor          VITALS:     Patient Vitals for the past 24 hrs:   Temp Pulse Resp BP   08/01/17 1318 - 83 18 158/89   08/01/17 1314 99 °F (37.2 °C) (!) 102 18 131/82     Wt Readings from Last 3 Encounters:   07/21/17 68 kg (150 lb)     Temp Readings from Last 3 Encounters:   08/01/17 99 °F (37.2 °C)     BP Readings from Last 3 Encounters: 08/01/17 158/89     Pulse Readings from Last 3 Encounters:   08/01/17 83            DATA     LABORATORY DATA:(reviewed/updated 8/2/2017)  No results found for this or any previous visit (from the past 24 hour(s)). Lab Results   Component Value Date/Time    Valproic acid 97 08/01/2017 04:46 AM     No results found for: LITHM   RADIOLOGY REPORTS:(reviewed/updated 8/2/2017)  No results found.        MEDICATIONS     ALL MEDICATIONS:   Current Facility-Administered Medications   Medication Dose Route Frequency    OLANZapine (ZyPREXA) tablet 5 mg  5 mg Oral DAILY    divalproex ER (DEPAKOTE ER) 24 hour tablet 1,500 mg  1,500 mg Oral QHS    Or    LORazepam (ATIVAN) injection 1 mg +++COURT ORDERED MEDICATION FOR REFUSAL OF PO DEPAKOTE+++  1 mg IntraMUSCular QHS    OLANZapine (ZyPREXA) tablet 20 mg  20 mg Oral QHS    clonazePAM (KlonoPIN) tablet 1 mg  1 mg Oral BID    ziprasidone (GEODON) 20 mg in sterile water (preservative free) 1 mL injection  20 mg IntraMUSCular BID PRN    OLANZapine (ZyPREXA) tablet 5 mg  5 mg Oral Q6H PRN    benztropine (COGENTIN) tablet 2 mg  2 mg Oral BID PRN    benztropine (COGENTIN) injection 2 mg  2 mg IntraMUSCular BID PRN    LORazepam (ATIVAN) injection 2 mg  2 mg IntraMUSCular Q4H PRN    LORazepam (ATIVAN) tablet 1 mg  1 mg Oral Q4H PRN    zolpidem (AMBIEN) tablet 10 mg  10 mg Oral QHS PRN    acetaminophen (TYLENOL) tablet 650 mg  650 mg Oral Q4H PRN    ibuprofen (MOTRIN) tablet 400 mg  400 mg Oral Q8H PRN    magnesium hydroxide (MILK OF MAGNESIA) 400 mg/5 mL oral suspension 30 mL  30 mL Oral DAILY PRN    nicotine (NICODERM CQ) 21 mg/24 hr patch 1 Patch  1 Patch TransDERmal DAILY PRN      SCHEDULED MEDICATIONS:   Current Facility-Administered Medications   Medication Dose Route Frequency    OLANZapine (ZyPREXA) tablet 5 mg  5 mg Oral DAILY    divalproex ER (DEPAKOTE ER) 24 hour tablet 1,500 mg  1,500 mg Oral QHS    Or    LORazepam (ATIVAN) injection 1 mg +++COURT ORDERED MEDICATION FOR REFUSAL OF PO DEPAKOTE+++  1 mg IntraMUSCular QHS    OLANZapine (ZyPREXA) tablet 20 mg  20 mg Oral QHS    clonazePAM (KlonoPIN) tablet 1 mg  1 mg Oral BID          ASSESSMENT & PLAN     DIAGNOSES REQUIRING ACTIVE TREATMENT AND MONITORING: (reviewed/updated 8/2/2017)  Patient C/Job Sanchez [address] Problem List:   Schizoaffective disorder, chronic condition with acute exacerbation ([facility] [address] (7/22/2017)      Assessment: minimal change- intrusive, labile, psychotic    Plan: I will ct to adjust med- Zyprexa, Depakote,Klonopin. Depakote level due on Tue     Noncompliance with treatment (7/22/2017)    Assessment: due to psychosis- court order med- now accepting med    Plan: Educate about the need for medications ,consider Long Acting Antipsychotic med. 8/1/17 will continue same treatment plan   8/2/17 increase her Zyprexa by 5 mg po daily to address her pressured speech and her mood      In summary, Korin Vazquez, is a 30 y.o.  female who presents with a severe exacerbation of the principal diagnosis of Schizoaffective disorder, chronic condition with acute exacerbation ([facility] [address]     Patient's condition is not stable . Patient requires continued inpatient hospitalization for further stabilization, safety monitoring and medication management. I will continue to coordinate the provision of individual, milieu, occupational, group, and substance abuse therapies to address target symptoms/diagnoses as deemed appropriate for the individual patient. A coordinated, multidisplinary treatment team round was conducted with the patient (this team consists of the nurse, psychiatric unit pharmcist,  and writer). Complete current electronic health record for patient has been reviewed today including consultant notes, ancillary staff notes, nurses and psychiatric tech notes. Suicide risk assessment completed and patient deemed to be of low risk for suicide at this time.      The following regarding medications was addressed during rounds with patient:   the risks and benefits of the proposed medication. The patient was given the opportunity to ask questions. Informed consent given to the use of the above medications. Will continue to adjust psychiatric and non-psychiatric medications (see above \"medication\" section and orders section for details) as deemed appropriate & based upon diagnoses and response to treatment. I will continue to order blood tests/labs and diagnostic tests as deemed appropriate and review results as they become available (see orders for details and above listed lab/test results). I will order psychiatric records from previous [facility] hospitals to further elucidate the nature of patient's psychopathology and review once available. I will gather additional collateral information from friends, family and o/p treatment team to further elucidate the nature of patient's psychopathology and baselline level of psychiatric functioning. I certify that this patient's inpatient psychiatric hospital services furnished since the previous certification were, and continue to be, required for treatment that could reasonably be expected to improve the patient's condition, or for diagnostic study, and that the patient continues to need, on a daily basis, active treatment furnished directly by or requiring the supervision of inpatient psychiatric facility personnel. In addition, the hospital records show that services furnished were intensive treatment services, admission or related services, or equivalent services.     EXPECTED DISCHARGE DATE/DAY: TBD     DISPOSITION:    Home       Signed By:   Bren Pérez MD  8/2/2017

## 2017-08-02 NOTE — PROGRESS NOTES
Problem: Manic Behavior (Adult/Pediatric)  Goal: *STG: Remains safe in hospital  Outcome: Progressing Towards Goal  Nsg note 7-3p shift: Pt is alert and oriented x 4. Mood is labile. Pt remains intrusive and hyper-verbal.  Pt needs some redirection at times due to her intrusive behavior. Pt was compliant with court ordered med but refused scheduled zyprexa. Pt has been compliant with meals. She attends and participates in groups. Pt did not receive any prn meds on the shift. Pt denies SI/HI and denies hallucinations. Pt will continue to be monitored q15 min checks for safety and needs.

## 2017-08-02 NOTE — PROGRESS NOTES
Michi Israel was very actively talking and came and went a couple of times during Spirituality Group about Compassion on [facility] 22 unit  Tj Giang [address] [facility] [address] ([id number])

## 2017-08-02 NOTE — BH NOTES
GROUP THERAPY PROGRESS NOTE    The patient Karena hartman 30 y.o. female is participating in Creative Expression Group. Group time: 1 hour    Personal goal for participation: To concentrate on selected task    Goal orientation: social    Group therapy participation: active    Therapeutic interventions reviewed and discussed: Crafts, games, music    Impression of participation: The patient was attentive.     Marcos Plascencia  8/2/2017  5:23 PM

## 2017-08-02 NOTE — BH NOTES
GROUP THERAPY PROGRESS NOTE    The patient Gary Simpson is participating in [employer]. Group time: 30 minutes    Personal goal for participation: to orient the patient to the unit.     Goal orientation: successful adoption of unit rules    Group therapy participation: minimal    Therapeutic interventions reviewed and discussed: Yes    Impression of participation:minimal    Piecre Angelic  8/2/2017 9:06 AM

## 2017-08-02 NOTE — BH NOTES
GROUP THERAPY PROGRESS NOTE    Jacinta Barnhart is participating in Nursing education group. Group time: 30 minutes    Personal goal for participation: Education    Goal orientation: medication education    Group therapy participation: active and disruptive    Therapeutic interventions reviewed and discussed: Discussed the importance of medication compliance. Impression of participation: Pt had good participation but argued with another pt. Both pts needed redirection.

## 2017-08-03 PROCEDURE — 74011250636 HC RX REV CODE- 250/636: Performed by: PSYCHIATRY & NEUROLOGY

## 2017-08-03 PROCEDURE — 65220000003 HC RM SEMIPRIVATE PSYCH

## 2017-08-03 PROCEDURE — 74011250637 HC RX REV CODE- 250/637: Performed by: PSYCHIATRY & NEUROLOGY

## 2017-08-03 RX ADMIN — CLONAZEPAM 1 MG: 1 TABLET ORAL at 08:49

## 2017-08-03 RX ADMIN — DIVALPROEX SODIUM 1500 MG: 500 TABLET, FILM COATED, EXTENDED RELEASE ORAL at 21:18

## 2017-08-03 RX ADMIN — ZOLPIDEM TARTRATE 10 MG: 10 TABLET, FILM COATED ORAL at 21:18

## 2017-08-03 RX ADMIN — LORAZEPAM 2 MG: 2 INJECTION INTRAMUSCULAR; INTRAVENOUS at 10:15

## 2017-08-03 RX ADMIN — CLONAZEPAM 1 MG: 1 TABLET ORAL at 16:48

## 2017-08-03 RX ADMIN — OLANZAPINE 25 MG: 10 TABLET, FILM COATED ORAL at 21:18

## 2017-08-03 NOTE — BH NOTES
GROUP THERAPY PROGRESS NOTE    The patient Gisela hartman 30 y.o. female is participating in Creative Expression Group. Group time: 1 hour    Personal goal for participation:  To concentrate on selected task    Goal orientation: social    Group therapy participation: active    Therapeutic interventions reviewed and discussed: Crafts, games, music    Impression of participation: The patient was attentive-required prompting    Janice Le  8/3/2017  5:37 PM

## 2017-08-03 NOTE — PROGRESS NOTES
Problem: Altered Thought Process (Adult/Pediatric)  Goal: *STG: Remains safe in hospital  Client a little less intrusive this pm.  States she is being discharged tomorrow. Has followed the rules for using the phone, basically because the staff has enforced the rules. Every now and then will attempt to talk them into letting her use it earlier than once an hour. Meal and med compliant, q 15 min checks for safety continue.

## 2017-08-03 NOTE — BH NOTES
Patient became increasingly intrusive and aggressive in the milieu. Patient anxious and uncooperative. Patient redirected to room and was unable to be redirected. Patient placed in open seclusion room. . 2 mg PRN ativan IM given. Will monitor patient 1:1 and anticipate needs.

## 2017-08-03 NOTE — PROGRESS NOTES
Problem: Altered Thought Process (Adult/Pediatric)  Goal: *STG: Remains safe in hospital  Outcome: Progressing Towards Goal  Patient alert and oriented x 4 at baseline. Continues to be intrusive, loud and demanding. Visible on the unit hyperverbal and manic. Somatic. Medication and meal compliant. Denies hallucinations, delusions, pain/discomfort, SI/HI. Constant redirection needed. Continues to state that she is hypoglycemic and needs snacks and juice all the time. Continues on Q15 min safety checks. Will continue to monitor and continue plan of care.

## 2017-08-03 NOTE — BH NOTES
GROUP THERAPY PROGRESS NOTE    The patient Benoit hartman 30 y.o. female is participating in Reflections Group    Group time: 30 minutes    Personal goal for participation: To discuss the daily goals    Goal orientation: personal    Group therapy participation: minimal    Therapeutic interventions reviewed and discussed:  Yes    Impression of participation: lori Oseguera  8/2/2017  10:01 PM

## 2017-08-03 NOTE — BH NOTES
PSYCHIATRIC PROGRESS NOTE         Patient Name  Karena Carlisle   Date of Birth 1980   [facility] [id number]   Medical Record Number  [id number]      Age  30 y.o. PCP None   Admit date:  7/21/2017    Room Number  514/00  @ [facility]   Date of Service  8/3/2017          PSYCHOTHERAPY SESSION NOTE:  Length of psychotherapy session: 15 minutes    Main condition/diagnosis/issues treated during session today, 8/3/2017 : compliance coping skills. irritability    I employed Cognitive Behavioral therapy techniques, Reality-Oriented psychotherapy, as well as supportive psychotherapy in regards to various ongoing psychosocial stressors, including the following: pre-admission and current problems; housing issues;occupational issues; academic issues; legal issues; medical issues; and stress of hospitalization. Interpersonal relationship issues and psychodynamic conflicts explored. Attempts made to alleviate maladaptive patterns. We, also, worked on issues of denial & effects of substance dependency/use     Overall, patient is minimally progressing    Treatment Plan Update (reviewed an updated 8/3/2017) : I will modify psychotherapy tx plan by implementing more stress management strategies, building upon cognitive behavioral techniques, increasing coping skills, as well as shoring up psychological defenses). An extended energy and skill set was needed to engage pt in psychotherapy due to some of the following: resistiveness, complexity, negativity, confrontational nature, hostile behaviors, and/or severe abnormalities in thought processes/psychosis resulting in the loss of expressive/receptive language communication skills. E & M PROGRESS NOTE:         HISTORY         HISTORY OF PRESENT ILLNESS/INTERVAL HISTORY:  (reviewed/updated 8/3/2017). CC: \"I am doing perfect\"  \".  Pt admitted under a [facility] home order (TDO)   for severe psychosis and wendy proving to be/posing an imminent danger to selfand an inability to care for self. HISTORY OF PRESENT ILLNESS:    The patient, Jacinta Barnhart, is a 30 y.o. WHITE [place]  female with a past psychiatric history significant for Schizoaffective disorder bipolar type , who presents at this time with complaints of (and/or evidence of) the following emotional symptoms: agitation, psychotic behavior, wendy and psychosis. Additional symptomatology include agitation, difficulty sleeping and increased irritability,she was found wandering around ,confuse,disoritented,unkempt , not wearing shoes or undergarments. The above symptoms have been present for one day . These symptoms are of moderate in  severity. These symptoms are constant  in nature. The patient's condition has been precipitated by and psychosocial stressors . treatment noncompliance. UDS: negative; BAL=0. Patient reported that she has been diagnosed with Schizoaffective disorder, since last 2 years she is not taking any medications and not following any psychiatrist. She reported that she has been tried on Tegretol,depakote, lithium ,haldol ,Risperidone and Zyprexa. She only agree to take [address] presents/reports/evidences the following emotional symptoms today, 8/3/2017:agitation, delusions and wendy. The above symptoms have been present for one day . These symptoms are of moderate in severity. The symptoms are intermittent/ fleeting in nature. Additional symptomatology and features include     7/31- paranoid delusion, intrusive, argumentative towards staff and peer,hyper verbal, disorganized. , repetitive.  Preoccupied with dc.  8/1/17 She slept well last night and she is  Compliant with medications and she still argumentative and she denied  Feeling paranoid and has better insight   8/2/17 she still requesting to leave and she is not feeling she needs to stay and she feels ready and she wants her boyfriend to come to get her , she is compliant with medications and she is less argumentative and not getting angry easily like before but still has pressured speech and denied self harm behavior and denied hearing voices    8/3/17 She still intrusive and getting angry easily and she was able to calm down , she was upset about not leaving today , she stated she slept well last night ands she feels she is ready to go , she has been not feeling as paranoid and no hearing voices but she still hypomanic       SIDE EFFECTS: (reviewed/updated 8/3/2017)  None reported or admitted to. ALLERGIES:(reviewed/updated 8/3/2017)  No Known Allergies   MEDICATIONS PRIOR TO ADMISSION:(reviewed/updated 8/3/2017)  Prescriptions Prior to Admission   Medication Sig    carBAMazepine XR (TEGRETOL XR) 100 mg SR tablet Take 100 mg by mouth two (2) times a day. Indications: Ana associated with Bipolar Disorder, MIXED BIPOLAR I DISORDER      PAST MEDICAL HISTORY: Past medical history from the initial psychiatric evaluation has been reviewed (reviewed/updated 8/3/2017) with no additional updates (I asked patient and no additional past medical history provided). Past Medical History:   Diagnosis Date    Anxiety disorder     Depression     Hypertension     Psychotic disorder      Past Surgical History:   Procedure Laterality Date    HX GYN        SOCIAL HISTORY: Social history from the initial psychiatric evaluation has been reviewed (reviewed/updated 8/3/2017) with no additional updates (I asked patient and no additional social history provided). Social History     Social History    Marital status: SINGLE     Spouse name: N/A    Number of children: N/A    Years of education: N/A     Occupational History    Not on file.      Social History Main Topics    Smoking status: Never Smoker    Smokeless tobacco: Never Used    Alcohol use No    Drug use: No    Sexual activity: Yes     Other Topics Concern    Not on file     Social History Narrative  No narrative on file      FAMILY HISTORY: Family history from the initial psychiatric evaluation has been reviewed (reviewed/updated 8/3/2017) with no additional updates (I asked patient and no additional family history provided). History reviewed. No pertinent family history. REVIEW OF SYSTEMS: (reviewed/updated 8/3/2017)  Appetite:improved   Sleep: improved   All other Review of Systems: Psychological ROS: positive for - hostility, irritability and mood swings  Respiratory ROS: no cough, shortness of breath, or wheezing  Cardiovascular ROS: no chest pain or dyspnea on exertion         [address] [facility] (MSE):    MSE FINDINGS ARE WITHIN NORMAL LIMITS (WNL) UNLESS OTHERWISE STATED BELOW. ( ALL OF THE BELOW CATEGORIES OF THE MSE HAVE BEEN REVIEWED (reviewed 8/3/2017) AND UPDATED AS DEEMED APPROPRIATE )  General Presentation age appropriate and casually dressed,  unco operative, unreliable   Orientation oriented to time, place and person   Vital Signs  See below (reviewed 8/3/2017); Vital Signs (BP, Pulse, & Temp) are within normal limits if not listed below. Gait and Station Stable/steady, no ataxia   Musculoskeletal System No extrapyramidal symptoms (EPS); no abnormal muscular movements or Tardive Dyskinesia (TD); muscle strength and tone are within normal limits   Language No aphasia or dysarthria   Speech:  hyperverbal, Pressured   Thought Processes logical; fast rate of thoughts; poor abstract reasoning/computation   Thought Associations tangential   Thought Content Paranoid delusion, free of hallucination   Suicidal Ideations no plan  and no intention   Homicidal Ideations none   Mood:   Irritable ,   Affect:  Labile, hostile, mood incongruent   Memory recent  intact   Memory remote:  intact   Concentration/Attention:  intact   Fund of Knowledge average   Insight:  limited   Reliability poor   Judgment:  poor          VITALS:     No data found.     Wt Readings from Last 3 Encounters:   07/21/17 68 kg (150 lb)     Temp Readings from Last 3 Encounters:   08/01/17 99 °F (37.2 °C)     BP Readings from Last 3 Encounters:   08/02/17 108/82     Pulse Readings from Last 3 Encounters:   08/02/17 94            DATA     LABORATORY DATA:(reviewed/updated 8/3/2017)  No results found for this or any previous visit (from the past 24 hour(s)). Lab Results   Component Value Date/Time    Valproic acid 97 08/01/2017 04:46 AM     No results found for: [facility]   RADIOLOGY REPORTS:(reviewed/updated 8/3/2017)  No results found.        MEDICATIONS     ALL MEDICATIONS:   Current Facility-Administered Medications   Medication Dose Route Frequency    OLANZapine (ZyPREXA) tablet 25 mg  25 mg Oral QHS    divalproex ER (DEPAKOTE ER) 24 hour tablet 1,500 mg  1,500 mg Oral QHS    Or    LORazepam (ATIVAN) injection 1 mg +++COURT ORDERED MEDICATION FOR REFUSAL OF PO DEPAKOTE+++  1 mg IntraMUSCular QHS    clonazePAM (KlonoPIN) tablet 1 mg  1 mg Oral BID    ziprasidone (GEODON) 20 mg in sterile water (preservative free) 1 mL injection  20 mg IntraMUSCular BID PRN    OLANZapine (ZyPREXA) tablet 5 mg  5 mg Oral Q6H PRN    benztropine (COGENTIN) tablet 2 mg  2 mg Oral BID PRN    benztropine (COGENTIN) injection 2 mg  2 mg IntraMUSCular BID PRN    LORazepam (ATIVAN) injection 2 mg  2 mg IntraMUSCular Q4H PRN    LORazepam (ATIVAN) tablet 1 mg  1 mg Oral Q4H PRN    zolpidem (AMBIEN) tablet 10 mg  10 mg Oral QHS PRN    acetaminophen (TYLENOL) tablet 650 mg  650 mg Oral Q4H PRN    ibuprofen (MOTRIN) tablet 400 mg  400 mg Oral Q8H PRN    magnesium hydroxide (MILK OF MAGNESIA) 400 mg/5 mL oral suspension 30 mL  30 mL Oral DAILY PRN    nicotine (NICODERM CQ) 21 mg/24 hr patch 1 Patch  1 Patch TransDERmal DAILY PRN      SCHEDULED MEDICATIONS:   Current Facility-Administered Medications   Medication Dose Route Frequency    OLANZapine (ZyPREXA) tablet 25 mg  25 mg Oral QHS    divalproex ER (DEPAKOTE ER) 24 hour tablet 1,500 mg  1,500 mg Oral QHS    Or    LORazepam (ATIVAN) injection 1 mg +++COURT ORDERED MEDICATION FOR REFUSAL OF PO DEPAKOTE+++  1 mg IntraMUSCular QHS    clonazePAM (KlonoPIN) tablet 1 mg  1 mg Oral BID          ASSESSMENT & PLAN     DIAGNOSES REQUIRING ACTIVE TREATMENT AND MONITORING: (reviewed/updated 8/3/2017)  Patient C/Job Sanchez [address] Problem List:   Schizoaffective disorder, chronic condition with acute exacerbation ([facility] [address] (7/22/2017)      Assessment: minimal change- intrusive, labile, psychotic    Plan: I will ct to adjust med- Zyprexa, Depakote,Klonopin. Depakote level due on Tue     Noncompliance with treatment (7/22/2017)    Assessment: due to psychosis- court order med- now accepting med    Plan: Educate about the need for medications ,consider Long Acting Antipsychotic med. 8/1/17 will continue same treatment plan   8/2/17 increase her Zyprexa by 5 mg po daily to address her pressured speech and her mood   8/3/17 will continue same medications      In summary, Blaise Polo, is a 30 y.o.  female who presents with a severe exacerbation of the principal diagnosis of Schizoaffective disorder, chronic condition with acute exacerbation ([facility] [address]     Patient's condition is not stable . Patient requires continued inpatient hospitalization for further stabilization, safety monitoring and medication management. I will continue to coordinate the provision of individual, milieu, occupational, group, and substance abuse therapies to address target symptoms/diagnoses as deemed appropriate for the individual patient. A coordinated, multidisplinary treatment team round was conducted with the patient (this team consists of the nurse, psychiatric unit pharmcist,  and writer). Complete current electronic health record for patient has been reviewed today including consultant notes, ancillary staff notes, nurses and psychiatric tech notes.     Suicide risk assessment completed and patient deemed to be of low risk for suicide at this time. The following regarding medications was addressed during rounds with patient:   the risks and benefits of the proposed medication. The patient was given the opportunity to ask questions. Informed consent given to the use of the above medications. Will continue to adjust psychiatric and non-psychiatric medications (see above \"medication\" section and orders section for details) as deemed appropriate & based upon diagnoses and response to treatment. I will continue to order blood tests/labs and diagnostic tests as deemed appropriate and review results as they become available (see orders for details and above listed lab/test results). I will order psychiatric records from previous [facility] hospitals to further elucidate the nature of patient's psychopathology and review once available. I will gather additional collateral information from friends, family and o/p treatment team to further elucidate the nature of patient's psychopathology and baselline level of psychiatric functioning. I certify that this patient's inpatient psychiatric hospital services furnished since the previous certification were, and continue to be, required for treatment that could reasonably be expected to improve the patient's condition, or for diagnostic study, and that the patient continues to need, on a daily basis, active treatment furnished directly by or requiring the supervision of inpatient psychiatric facility personnel. In addition, the hospital records show that services furnished were intensive treatment services, admission or related services, or equivalent services.     EXPECTED DISCHARGE DATE/DAY: TBD     DISPOSITION:    Home       Signed By:   Cheri Erwin MD  8/3/2017

## 2017-08-03 NOTE — BH NOTES
Pt rested quietly in bed with eyes closed. No signs/symptoms of distress. Will monitor for changes. Q 15 minute checks continue. 2 hour chart audit done.  Slept  7   hours since 2200

## 2017-08-04 PROCEDURE — 65220000003 HC RM SEMIPRIVATE PSYCH

## 2017-08-04 PROCEDURE — 74011250637 HC RX REV CODE- 250/637: Performed by: PSYCHIATRY & NEUROLOGY

## 2017-08-04 RX ORDER — OLANZAPINE 10 MG/1
30 TABLET ORAL
Status: DISCONTINUED | OUTPATIENT
Start: 2017-08-04 | End: 2017-08-10 | Stop reason: HOSPADM

## 2017-08-04 RX ORDER — ZOLPIDEM TARTRATE 5 MG/1
5 TABLET ORAL
Status: DISCONTINUED | OUTPATIENT
Start: 2017-08-04 | End: 2017-08-10 | Stop reason: HOSPADM

## 2017-08-04 RX ADMIN — OLANZAPINE 30 MG: 10 TABLET, FILM COATED ORAL at 21:19

## 2017-08-04 RX ADMIN — CLONAZEPAM 1 MG: 1 TABLET ORAL at 16:13

## 2017-08-04 RX ADMIN — ZOLPIDEM TARTRATE 5 MG: 5 TABLET, FILM COATED ORAL at 21:19

## 2017-08-04 RX ADMIN — DIVALPROEX SODIUM 1500 MG: 500 TABLET, FILM COATED, EXTENDED RELEASE ORAL at 21:20

## 2017-08-04 RX ADMIN — CLONAZEPAM 1 MG: 1 TABLET ORAL at 08:13

## 2017-08-04 RX ADMIN — LORAZEPAM 1 MG: 1 TABLET ORAL at 17:41

## 2017-08-04 NOTE — BH NOTES
GROUP THERAPY PROGRESS NOTE    The patient Mariaelena hartman 30 y.o. female is participating in Coping Skills Group. Group time: 45 minutes    Personal goal for participation:  To identify positive words to live by    Goal orientation:  personal    Group therapy participation: active and disruptive    Therapeutic interventions reviewed and discussed: worksheet    Impression of participation:  The patient continues to be loud,hyperverbal,intrusive at times-requires redirection    Flaquita Boswell  8/4/2017  1:39 PM

## 2017-08-04 NOTE — BH NOTES
GROUP THERAPY PROGRESS NOTE    Korin George is participating in [place].      Group time: 30 minutes    Personal goal for participation: daily orientation    Goal orientation: personal    Group therapy participation: active    Therapeutic interventions reviewed and discussed: yes    Impression of participation: fair

## 2017-08-04 NOTE — BH NOTES
PSYCHIATRIC PROGRESS NOTE         Patient Name  Brandie Ordonez   Date of Birth 1980   [facility] [id number]   Medical Record Number  [id number]      Age  30 y.o. PCP None   Admit date:  7/21/2017    Room Number  658/46  @ [facility]   Date of Service  8/4/2017          PSYCHOTHERAPY SESSION NOTE:  Length of psychotherapy session: 15 minutes    Main condition/diagnosis/issues treated during session today, 8/4/2017 : compliance coping skills. irritability    I employed Cognitive Behavioral therapy techniques, Reality-Oriented psychotherapy, as well as supportive psychotherapy in regards to various ongoing psychosocial stressors, including the following: pre-admission and current problems; housing issues;occupational issues; academic issues; legal issues; medical issues; and stress of hospitalization. Interpersonal relationship issues and psychodynamic conflicts explored. Attempts made to alleviate maladaptive patterns. We, also, worked on issues of denial & effects of substance dependency/use     Overall, patient is minimally progressing    Treatment Plan Update (reviewed an updated 8/4/2017) : I will modify psychotherapy tx plan by implementing more stress management strategies, building upon cognitive behavioral techniques, increasing coping skills, as well as shoring up psychological defenses). An extended energy and skill set was needed to engage pt in psychotherapy due to some of the following: resistiveness, complexity, negativity, confrontational nature, hostile behaviors, and/or severe abnormalities in thought processes/psychosis resulting in the loss of expressive/receptive language communication skills. E & M PROGRESS NOTE:         HISTORY         HISTORY OF PRESENT ILLNESS/INTERVAL HISTORY:  (reviewed/updated 8/4/2017). CC: \"I am doing perfect\"  \".  Pt admitted under a [facility] order (TDO)   for severe psychosis and wendy proving to be/posing an imminent danger to selfand an inability to care for self. HISTORY OF PRESENT ILLNESS:    The patient, Marcin Westbrook, is a 30 y.o. WHITE [place]  female with a past psychiatric history significant for Schizoaffective disorder bipolar type , who presents at this time with complaints of (and/or evidence of) the following emotional symptoms: agitation, psychotic behavior, wendy and psychosis. Additional symptomatology include agitation, difficulty sleeping and increased irritability,she was found wandering around ,confuse,disoritented,unkempt , not wearing shoes or undergarments. The above symptoms have been present for one day . These symptoms are of moderate in  severity. These symptoms are constant  in nature. The patient's condition has been precipitated by and psychosocial stressors . treatment noncompliance. UDS: negative; BAL=0. Patient reported that she has been diagnosed with Schizoaffective disorder, since last 2 years she is not taking any medications and not following any psychiatrist. She reported that she has been tried on Tegretol,depakote, lithium ,haldol ,Risperidone and Zyprexa. She only agree to take [address] presents/reports/evidences the following emotional symptoms today, 8/4/2017:agitation, delusions and wendy. The above symptoms have been present for one day . These symptoms are of moderate in severity. The symptoms are intermittent/ fleeting in nature. Additional symptomatology and features include     7/31- paranoid delusion, intrusive, argumentative towards staff and peer,hyper verbal, disorganized. , repetitive.  Preoccupied with dc.  8/1/17 She slept well last night and she is  Compliant with medications and she still argumentative and she denied  Feeling paranoid and has better insight   8/2/17 she still requesting to leave and she is not feeling she needs to stay and she feels ready and she wants her boyfriend to come to get her , she is compliant with medications and she is less argumentative and not getting angry easily like before but still has pressured speech and denied self harm behavior and denied hearing voices    8/3/17 She still intrusive and getting angry easily and she was able to calm down , she was upset about not leaving today , she stated she slept well last night ands she feels she is ready to go , she has been not feeling as paranoid and no hearing voices but she still hypomanic   8/4/17 she still struggle to follow directions and she still manic and has pressured speech and she is  compliant with her medications and no aggressive behavior  But  She still gets angry and irritable       SIDE EFFECTS: (reviewed/updated 8/4/2017)  None reported or admitted to. ALLERGIES:(reviewed/updated 8/4/2017)  No Known Allergies   MEDICATIONS PRIOR TO ADMISSION:(reviewed/updated 8/4/2017)  Prescriptions Prior to Admission   Medication Sig    carBAMazepine XR (TEGRETOL XR) 100 mg SR tablet Take 100 mg by mouth two (2) times a day. Indications: Ana associated with Bipolar Disorder, MIXED BIPOLAR I DISORDER      PAST MEDICAL HISTORY: Past medical history from the initial psychiatric evaluation has been reviewed (reviewed/updated 8/4/2017) with no additional updates (I asked patient and no additional past medical history provided). Past Medical History:   Diagnosis Date    Anxiety disorder     Depression     Hypertension     Psychotic disorder      Past Surgical History:   Procedure Laterality Date    HX GYN        SOCIAL HISTORY: Social history from the initial psychiatric evaluation has been reviewed (reviewed/updated 8/4/2017) with no additional updates (I asked patient and no additional social history provided). Social History     Social History    Marital status: SINGLE     Spouse name: N/A    Number of children: N/A    Years of education: N/A     Occupational History    Not on file.      Social History Main Topics    Smoking status: Never Smoker    Smokeless tobacco: Never Used    Alcohol use No    Drug use: No    Sexual activity: Yes     Other Topics Concern    Not on file     Social History Narrative    No narrative on file      FAMILY HISTORY: Family history from the initial psychiatric evaluation has been reviewed (reviewed/updated 8/4/2017) with no additional updates (I asked patient and no additional family history provided). History reviewed. No pertinent family history. REVIEW OF SYSTEMS: (reviewed/updated 8/4/2017)  Appetite:improved   Sleep: improved   All other Review of Systems: Psychological ROS: positive for - hostility, irritability and mood swings  Respiratory ROS: no cough, shortness of breath, or wheezing  Cardiovascular ROS: no chest pain or dyspnea on exertion         [facility] (MSE):    MSE FINDINGS ARE WITHIN NORMAL LIMITS (WNL) UNLESS OTHERWISE STATED BELOW. ( ALL OF THE BELOW CATEGORIES OF THE MSE HAVE BEEN REVIEWED (reviewed 8/4/2017) AND UPDATED AS DEEMED APPROPRIATE )  General Presentation age appropriate and casually dressed,  unco operative, unreliable   Orientation oriented to time, place and person   Vital Signs  See below (reviewed 8/4/2017); Vital Signs (BP, Pulse, & Temp) are within normal limits if not listed below.    Gait and Station Stable/steady, no ataxia   Musculoskeletal System No extrapyramidal symptoms (EPS); no abnormal muscular movements or Tardive Dyskinesia (TD); muscle strength and tone are within normal limits   Language No aphasia or dysarthria   Speech:  hyperverbal, Pressured   Thought Processes logical; fast rate of thoughts; poor abstract reasoning/computation   Thought Associations tangential   Thought Content Paranoid delusion, free of hallucination   Suicidal Ideations no plan  and no intention   Homicidal Ideations none   Mood:   Irritable ,   Affect:  Labile, hostile, mood incongruent   Memory recent  intact Memory remote:  intact   Concentration/Attention:  intact   Fund of Knowledge average   Insight:  limited   Reliability poor   Judgment:  poor          VITALS:     No data found. Wt Readings from Last 3 Encounters:   07/21/17 68 kg (150 lb)     Temp Readings from Last 3 Encounters:   08/01/17 99 °F (37.2 °C)     BP Readings from Last 3 Encounters:   08/02/17 108/82     Pulse Readings from Last 3 Encounters:   08/02/17 94            DATA     LABORATORY DATA:(reviewed/updated 8/4/2017)  No results found for this or any previous visit (from the past 24 hour(s)). Lab Results   Component Value Date/Time    Valproic acid 97 08/01/2017 04:46 AM     No results found for: LITHM   RADIOLOGY REPORTS:(reviewed/updated 8/4/2017)  No results found.        MEDICATIONS     ALL MEDICATIONS:   Current Facility-Administered Medications   Medication Dose Route Frequency    OLANZapine (ZyPREXA) tablet 30 mg  30 mg Oral QHS    zolpidem (AMBIEN) tablet 5 mg  5 mg Oral QHS PRN    divalproex ER (DEPAKOTE ER) 24 hour tablet 1,500 mg  1,500 mg Oral QHS    Or    LORazepam (ATIVAN) injection 1 mg +++COURT ORDERED MEDICATION FOR REFUSAL OF PO DEPAKOTE+++  1 mg IntraMUSCular QHS    clonazePAM (KlonoPIN) tablet 1 mg  1 mg Oral BID    ziprasidone (GEODON) 20 mg in sterile water (preservative free) 1 mL injection  20 mg IntraMUSCular BID PRN    OLANZapine (ZyPREXA) tablet 5 mg  5 mg Oral Q6H PRN    benztropine (COGENTIN) tablet 2 mg  2 mg Oral BID PRN    benztropine (COGENTIN) injection 2 mg  2 mg IntraMUSCular BID PRN    LORazepam (ATIVAN) injection 2 mg  2 mg IntraMUSCular Q4H PRN    LORazepam (ATIVAN) tablet 1 mg  1 mg Oral Q4H PRN    acetaminophen (TYLENOL) tablet 650 mg  650 mg Oral Q4H PRN    ibuprofen (MOTRIN) tablet 400 mg  400 mg Oral Q8H PRN    magnesium hydroxide (MILK OF MAGNESIA) 400 mg/5 mL oral suspension 30 mL  30 mL Oral DAILY PRN    nicotine (NICODERM CQ) 21 mg/24 hr patch 1 Patch  1 Patch TransDERmal DAILY PRN SCHEDULED MEDICATIONS:   Current Facility-Administered Medications   Medication Dose Route Frequency    OLANZapine (ZyPREXA) tablet 30 mg  30 mg Oral QHS    divalproex ER (DEPAKOTE ER) 24 hour tablet 1,500 mg  1,500 mg Oral QHS    Or    LORazepam (ATIVAN) injection 1 mg +++COURT ORDERED MEDICATION FOR REFUSAL OF PO DEPAKOTE+++  1 mg IntraMUSCular QHS    clonazePAM (KlonoPIN) tablet 1 mg  1 mg Oral BID          ASSESSMENT & PLAN     DIAGNOSES REQUIRING ACTIVE TREATMENT AND MONITORING: (reviewed/updated 8/4/2017)  Patient C/Job Sanchez [address] Problem List:   Schizoaffective disorder, chronic condition with acute exacerbation ([facility] [address] (7/22/2017)      Assessment: minimal change- intrusive, labile, psychotic    Plan: I will ct to adjust med- Zyprexa, Depakote,Klonopin. Depakote level due on Tue     Noncompliance with treatment (7/22/2017)    Assessment: due to psychosis- court order med- now accepting med    Plan: Educate about the need for medications ,consider Long Acting Antipsychotic med. 8/1/17 will continue same treatment plan   8/2/17 increase her Zyprexa by 5 mg po daily to address her pressured speech and her mood   8/3/17 will continue same medications  8/4/17 Continue treatment plan       In summary, Yvon Garcia, is a 30 y.o.  female who presents with a severe exacerbation of the principal diagnosis of Schizoaffective disorder, chronic condition with acute exacerbation ([facility] [address]     Patient's condition is not stable . Patient requires continued inpatient hospitalization for further stabilization, safety monitoring and medication management. I will continue to coordinate the provision of individual, milieu, occupational, group, and substance abuse therapies to address target symptoms/diagnoses as deemed appropriate for the individual patient.   A coordinated, multidisplinary treatment team round was conducted with the patient (this team consists of the nurse, psychiatric unit pharmcist, social worker and writer). Complete current electronic health record for patient has been reviewed today including consultant notes, ancillary staff notes, nurses and psychiatric tech notes. Suicide risk assessment completed and patient deemed to be of low risk for suicide at this time. The following regarding medications was addressed during rounds with patient:   the risks and benefits of the proposed medication. The patient was given the opportunity to ask questions. Informed consent given to the use of the above medications. Will continue to adjust psychiatric and non-psychiatric medications (see above \"medication\" section and orders section for details) as deemed appropriate & based upon diagnoses and response to treatment. I will continue to order blood tests/labs and diagnostic tests as deemed appropriate and review results as they become available (see orders for details and above listed lab/test results). I will order psychiatric records from previous [facility] hospitals to further elucidate the nature of patient's psychopathology and review once available. I will gather additional collateral information from friends, family and o/p treatment team to further elucidate the nature of patient's psychopathology and baselline level of psychiatric functioning. I certify that this patient's inpatient psychiatric hospital services furnished since the previous certification were, and continue to be, required for treatment that could reasonably be expected to improve the patient's condition, or for diagnostic study, and that the patient continues to need, on a daily basis, active treatment furnished directly by or requiring the supervision of inpatient psychiatric facility personnel. In addition, the hospital records show that services furnished were intensive treatment services, admission or related services, or equivalent services.     EXPECTED DISCHARGE DATE/DAY: TBD     DISPOSITION:    Home Signed By:   Cheri Erwin MD  8/4/2017

## 2017-08-04 NOTE — BH NOTES
GROUP THERAPY PROGRESS NOTE    The patient Radha hartman 30 y.o. female is participating in [employer]. Group time: 1 hour    Personal goal for participation:  To concentrate on selected task    Goal orientation: social    Group therapy participation: minimal    Therapeutic interventions reviewed and discussed: Crafts, games, music    Impression of participation: The patient was present-left early    Noy Delgado  8/4/2017  5:20 PM

## 2017-08-04 NOTE — PROGRESS NOTES
Problem: Altered Thought Process (Adult/Pediatric)  Goal: *STG: Remains safe in hospital  Outcome: Progressing Towards Goal  Pt remains loud and hyperverbal.Focused on phone calls and discharge. Medications adjusted per Dr Scott Lee. Intrusive with peers and staff. Requires constant redirection. Continue q15 minute safety checks.

## 2017-08-05 PROCEDURE — 65220000003 HC RM SEMIPRIVATE PSYCH

## 2017-08-05 PROCEDURE — 74011250637 HC RX REV CODE- 250/637: Performed by: PSYCHIATRY & NEUROLOGY

## 2017-08-05 RX ADMIN — LORAZEPAM 1 MG: 1 TABLET ORAL at 12:38

## 2017-08-05 RX ADMIN — OLANZAPINE 5 MG: 5 TABLET, FILM COATED ORAL at 12:37

## 2017-08-05 RX ADMIN — CLONAZEPAM 1 MG: 1 TABLET ORAL at 18:15

## 2017-08-05 RX ADMIN — DIVALPROEX SODIUM 1500 MG: 500 TABLET, FILM COATED, EXTENDED RELEASE ORAL at 21:26

## 2017-08-05 RX ADMIN — IBUPROFEN 400 MG: 400 TABLET, FILM COATED ORAL at 06:12

## 2017-08-05 RX ADMIN — CLONAZEPAM 1 MG: 1 TABLET ORAL at 08:53

## 2017-08-05 RX ADMIN — OLANZAPINE 30 MG: 10 TABLET, FILM COATED ORAL at 21:25

## 2017-08-05 NOTE — BH NOTES
Pt was received asleep,and visible on unit. Pt is meal and med compliant. Pt continues to be manic, having verbal altercations with other peers over the phone. Pt tends to disturb the milieu with her loud tone. Staff redirects pt quite often during shift as she is very repetitive and doesn't want to talk about her treatment but her personal situations. Staff will continue to monitor pt Q 15 min checks and encourage pt to attend groups.

## 2017-08-05 NOTE — PROGRESS NOTES
Problem: Altered Thought Process (Adult/Pediatric)  Goal: *STG: Remains safe in hospital  Outcome: Progressing Towards Goal  Patient has been visible on the unit during this shift with manic mood and labile affect. Pt has been very disruptive, yelling and screaming about making phone calls. During dinner, fish was served. Pt heard a peer verbalized that he has allergy to fish and sea foods, then pt states she is allergic to fish and sea food. Cafeteria called for another meal, a turkey sandwich provided by the cafeteria. Fish and sea food added to pt's allergy list. Pt has been preoccupied with  and domestic finances the entire shift. Pt requires several redirection the entire shift Pt denies any S/H thought and/or A/V hallucination. Pt has been medication and meal compliant. Pt encouraged to attend all group activities and to discuss any issues or concerns with staff. Staff will also continue to monitor pt with Q -15 checks for safety.

## 2017-08-05 NOTE — BH NOTES
PSYCHIATRIC PROGRESS NOTE         Patient Name  Teodora Moss   Date of Birth 1980   [facility] [id number]   Medical Record Number  [id number]      Age  30 y.o. PCP None   Admit date:  7/21/2017    Room Number  550/61  @ [facility]   Date of Service  8/5/2017          PSYCHOTHERAPY SESSION NOTE:  Length of psychotherapy session: 15 minutes    Main condition/diagnosis/issues treated during session today, 8/5/2017 : compliance coping skills. irritability    I employed Cognitive Behavioral therapy techniques, Reality-Oriented psychotherapy, as well as supportive psychotherapy in regards to various ongoing psychosocial stressors, including the following: pre-admission and current problems; housing issues;occupational issues; academic issues; legal issues; medical issues; and stress of hospitalization. Interpersonal relationship issues and psychodynamic conflicts explored. Attempts made to alleviate maladaptive patterns. We, also, worked on issues of denial & effects of substance dependency/use     Overall, patient is minimally progressing    Treatment Plan Update (reviewed an updated 8/5/2017) : I will modify psychotherapy tx plan by implementing more stress management strategies, building upon cognitive behavioral techniques, increasing coping skills, as well as shoring up psychological defenses). An extended energy and skill set was needed to engage pt in psychotherapy due to some of the following: resistiveness, complexity, negativity, confrontational nature, hostile behaviors, and/or severe abnormalities in thought processes/psychosis resulting in the loss of expressive/receptive language communication skills. E & M PROGRESS NOTE:         HISTORY         HISTORY OF PRESENT ILLNESS/INTERVAL HISTORY:  (reviewed/updated 8/5/2017). CC: \"I am doing perfect\"  \".  Pt admitted under a [facility] assisted order (TDO)   for severe psychosis and wendy proving to be/posing an imminent danger to selfand an inability to care for self. HISTORY OF PRESENT ILLNESS:    The patient, Nanette Joshua, is a 30 y.o. WHITE [place]  female with a past psychiatric history significant for Schizoaffective disorder bipolar type , who presents at this time with complaints of (and/or evidence of) the following emotional symptoms: agitation, psychotic behavior, wendy and psychosis. Additional symptomatology include agitation, difficulty sleeping and increased irritability,she was found wandering around ,confuse,disoritented,unkempt , not wearing shoes or undergarments. The above symptoms have been present for one day . These symptoms are of moderate in  severity. These symptoms are constant  in nature. The patient's condition has been precipitated by and psychosocial stressors . treatment noncompliance. UDS: negative; BAL=0. Patient reported that she has been diagnosed with Schizoaffective disorder, since last 2 years she is not taking any medications and not following any psychiatrist. She reported that she has been tried on Tegretol,depakote, lithium ,haldol ,Risperidone and Zyprexa. She only agree to take [address] presents/reports/evidences the following emotional symptoms today, 8/5/2017:agitation, delusions and wendy. The above symptoms have been present for one day . These symptoms are of moderate in severity. The symptoms are intermittent/ fleeting in nature. Additional symptomatology and features include     7/31- paranoid delusion, intrusive, argumentative towards staff and peer,hyper verbal, disorganized. , repetitive.  Preoccupied with dc.  8/1/17 She slept well last night and she is  Compliant with medications and she still argumentative and she denied  Feeling paranoid and has better insight   8/2/17 she still requesting to leave and she is not feeling she needs to stay and she feels ready and she wants her boyfriend to come to get her , she is compliant with medications and she is less argumentative and not getting angry easily like before but still has pressured speech and denied self harm behavior and denied hearing voices    8/3/17 She still intrusive and getting angry easily and she was able to calm down , she was upset about not leaving today , she stated she slept well last night ands she feels she is ready to go , she has been not feeling as paranoid and no hearing voices but she still hypomanic   8/4/17 she still struggle to follow directions and she still manic and has pressured speech and she is  compliant with her medications and no aggressive behavior  But  She still gets angry and irritable   08/5/17: Seen today,reported doing well,intrusive,requires frequent redirections, hyper verbal,decreased pressured speech,accepting po medications,sleep and appetite is good,required no prn. SIDE EFFECTS: (reviewed/updated 8/5/2017)  None reported or admitted to. ALLERGIES:(reviewed/updated 8/5/2017)  Allergies   Allergen Reactions    Fish Containing Products Anaphylaxis    Sea Food [Seafood] Anaphylaxis      MEDICATIONS PRIOR TO ADMISSION:(reviewed/updated 8/5/2017)  Prescriptions Prior to Admission   Medication Sig    carBAMazepine XR (TEGRETOL XR) 100 mg SR tablet Take 100 mg by mouth two (2) times a day. Indications: Ana associated with Bipolar Disorder, MIXED BIPOLAR I DISORDER      PAST MEDICAL HISTORY: Past medical history from the initial psychiatric evaluation has been reviewed (reviewed/updated 8/5/2017) with no additional updates (I asked patient and no additional past medical history provided).    Past Medical History:   Diagnosis Date    Anxiety disorder     Depression     Hypertension     Psychotic disorder      Past Surgical History:   Procedure Laterality Date    HX GYN        SOCIAL HISTORY: Social history from the initial psychiatric evaluation has been reviewed (reviewed/updated 8/5/2017) with no additional updates (I asked patient and no additional social history provided). Social History     Social History    Marital status: SINGLE     Spouse name: N/A    Number of children: N/A    Years of education: N/A     Occupational History    Not on file. Social History Main Topics    Smoking status: Never Smoker    Smokeless tobacco: Never Used    Alcohol use No    Drug use: No    Sexual activity: Yes     Other Topics Concern    Not on file     Social History Narrative    No narrative on file      FAMILY HISTORY: Family history from the initial psychiatric evaluation has been reviewed (reviewed/updated 8/5/2017) with no additional updates (I asked patient and no additional family history provided). History reviewed. No pertinent family history. REVIEW OF SYSTEMS: (reviewed/updated 8/5/2017)  Appetite:improved   Sleep: 8 hours    All other Review of Systems: Psychological ROS: positive for - hostility, irritability and mood swings  Respiratory ROS: no cough, shortness of breath, or wheezing  Cardiovascular ROS: no chest pain or dyspnea on exertion         [address] [place] [place]    [facility] [place] FINDINGS ARE WITHIN NORMAL LIMITS (WNL) UNLESS OTHERWISE STATED BELOW. ( ALL OF THE BELOW CATEGORIES OF THE [facility] [place] HAVE BEEN REVIEWED (reviewed 8/5/2017) AND UPDATED AS DEEMED APPROPRIATE )  General Presentation age appropriate and casually dressed,  unco operative, unreliable   Orientation oriented to time, place and person   Vital Signs  See below (reviewed 8/5/2017); Vital Signs (BP, Pulse, & Temp) are within normal limits if not listed below.    Gait and Station Stable/steady, no ataxia   Musculoskeletal System No extrapyramidal symptoms (EPS); no abnormal muscular movements or Tardive Dyskinesia (TD); muscle strength and tone are within normal limits   Language No aphasia or dysarthria   Speech:  hyperverbal, Pressured   Thought Processes logical; fast rate of thoughts; poor abstract reasoning/computation   Thought Associations tangential   Thought Content Paranoid delusion, free of hallucination   Suicidal Ideations no plan  and no intention   Homicidal Ideations none   Mood:   Irritable ,   Affect:  Less labile,less agitated   Memory recent  intact   Memory remote:  intact   Concentration/Attention:  intact   Fund of Knowledge average   Insight:  limited   Reliability poor   Judgment:  poor          VITALS:     Patient Vitals for the past 24 hrs:   Temp Pulse Resp BP   08/05/17 1400 97.9 °F (36.6 °C) 86 18 132/84     Wt Readings from Last 3 Encounters:   07/21/17 68 kg (150 lb)     Temp Readings from Last 3 Encounters:   08/05/17 97.9 °F (36.6 °C)     BP Readings from Last 3 Encounters:   08/05/17 132/84     Pulse Readings from Last 3 Encounters:   08/05/17 86            DATA     LABORATORY DATA:(reviewed/updated 8/5/2017)  No results found for this or any previous visit (from the past 24 hour(s)). Lab Results   Component Value Date/Time    Valproic acid 97 08/01/2017 04:46 AM     No results found for: [facility]   RADIOLOGY REPORTS:(reviewed/updated 8/5/2017)  No results found.        MEDICATIONS     ALL MEDICATIONS:   Current Facility-Administered Medications   Medication Dose Route Frequency    OLANZapine (ZyPREXA) tablet 30 mg  30 mg Oral QHS    zolpidem (AMBIEN) tablet 5 mg  5 mg Oral QHS PRN    divalproex ER (DEPAKOTE ER) 24 hour tablet 1,500 mg  1,500 mg Oral QHS    Or    LORazepam (ATIVAN) injection 1 mg +++COURT ORDERED MEDICATION FOR REFUSAL OF PO DEPAKOTE+++  1 mg IntraMUSCular QHS    clonazePAM (KlonoPIN) tablet 1 mg  1 mg Oral BID    ziprasidone (GEODON) 20 mg in sterile water (preservative free) 1 mL injection  20 mg IntraMUSCular BID PRN    OLANZapine (ZyPREXA) tablet 5 mg  5 mg Oral Q6H PRN    benztropine (COGENTIN) tablet 2 mg  2 mg Oral BID PRN    benztropine (COGENTIN) injection 2 mg  2 mg IntraMUSCular BID PRN    LORazepam (ATIVAN) injection 2 mg  2 mg IntraMUSCular Q4H PRN    LORazepam (ATIVAN) tablet 1 mg  1 mg Oral Q4H PRN    acetaminophen (TYLENOL) tablet 650 mg  650 mg Oral Q4H PRN    ibuprofen (MOTRIN) tablet 400 mg  400 mg Oral Q8H PRN    magnesium hydroxide (MILK OF MAGNESIA) 400 mg/5 mL oral suspension 30 mL  30 mL Oral DAILY PRN    nicotine (NICODERM CQ) 21 mg/24 hr patch 1 Patch  1 Patch TransDERmal DAILY PRN      SCHEDULED MEDICATIONS:   Current Facility-Administered Medications   Medication Dose Route Frequency    OLANZapine (ZyPREXA) tablet 30 mg  30 mg Oral QHS    divalproex ER (DEPAKOTE ER) 24 hour tablet 1,500 mg  1,500 mg Oral QHS    Or    LORazepam (ATIVAN) injection 1 mg +++COURT ORDERED MEDICATION FOR REFUSAL OF PO DEPAKOTE+++  1 mg IntraMUSCular QHS    clonazePAM (KlonoPIN) tablet 1 mg  1 mg Oral BID          ASSESSMENT & PLAN     DIAGNOSES REQUIRING ACTIVE TREATMENT AND MONITORING: (reviewed/updated 8/5/2017)  Patient Active Hospital Problem List:   Schizoaffective disorder, chronic condition with acute exacerbation ([facility] [address] (7/22/2017)      Assessment: minimal change- intrusive, labile, psychotic    Plan: I will ct to adjust med- Zyprexa, Depakote,Klonopin. Depakote level due on Tue     Noncompliance with treatment (7/22/2017)    Assessment: due to psychosis- court order med- now accepting med    Plan: Educate about the need for medications ,consider Long Acting Antipsychotic med. 8/1/17 will continue same treatment plan   8/2/17 increase her Zyprexa by 5 mg po daily to address her pressured speech and her mood   8/3/17 will continue same medications  8/4/17 Continue treatment plan    8/5/17: Continue current treatment,porvdie supportive therapies  In summary, Eva Blackman, is a 30 y.o.  female who presents with a severe exacerbation of the principal diagnosis of Schizoaffective disorder, chronic condition with acute exacerbation ([facility] [address]     Patient's condition is not stable .   Patient requires continued inpatient hospitalization for further stabilization, safety monitoring and medication management. I will continue to coordinate the provision of individual, milieu, occupational, group, and substance abuse therapies to address target symptoms/diagnoses as deemed appropriate for the individual patient. A coordinated, multidisplinary treatment team round was conducted with the patient (this team consists of the nurse, psychiatric unit pharmcist,  and writer). Complete current electronic health record for patient has been reviewed today including consultant notes, ancillary staff notes, nurses and psychiatric tech notes. Suicide risk assessment completed and patient deemed to be of low risk for suicide at this time. The following regarding medications was addressed during rounds with patient:   the risks and benefits of the proposed medication. The patient was given the opportunity to ask questions. Informed consent given to the use of the above medications. Will continue to adjust psychiatric and non-psychiatric medications (see above \"medication\" section and orders section for details) as deemed appropriate & based upon diagnoses and response to treatment. I will continue to order blood tests/labs and diagnostic tests as deemed appropriate and review results as they become available (see orders for details and above listed lab/test results). I will order psychiatric records from previous [facility] hospitals to further elucidate the nature of patient's psychopathology and review once available. I will gather additional collateral information from friends, family and o/p treatment team to further elucidate the nature of patient's psychopathology and baselline level of psychiatric functioning.          I certify that this patient's inpatient psychiatric hospital services furnished since the previous certification were, and continue to be, required for treatment that could reasonably be expected to improve the patient's condition, or for diagnostic study, and that the patient continues to need, on a daily basis, active treatment furnished directly by or requiring the supervision of inpatient psychiatric facility personnel. In addition, the hospital records show that services furnished were intensive treatment services, admission or related services, or equivalent services.     EXPECTED DISCHARGE DATE/DAY: TBD     DISPOSITION:    Home       Signed By:   Lauri Rawls MD  8/5/2017

## 2017-08-06 PROCEDURE — 74011250637 HC RX REV CODE- 250/637: Performed by: PSYCHIATRY & NEUROLOGY

## 2017-08-06 PROCEDURE — 65220000003 HC RM SEMIPRIVATE PSYCH

## 2017-08-06 RX ADMIN — DIVALPROEX SODIUM 1500 MG: 500 TABLET, FILM COATED, EXTENDED RELEASE ORAL at 21:32

## 2017-08-06 RX ADMIN — ZOLPIDEM TARTRATE 5 MG: 5 TABLET, FILM COATED ORAL at 21:35

## 2017-08-06 RX ADMIN — CLONAZEPAM 1 MG: 1 TABLET ORAL at 17:12

## 2017-08-06 RX ADMIN — CLONAZEPAM 1 MG: 1 TABLET ORAL at 08:54

## 2017-08-06 RX ADMIN — OLANZAPINE 30 MG: 10 TABLET, FILM COATED ORAL at 21:32

## 2017-08-06 NOTE — PROGRESS NOTES
Problem: Altered Thought Process (Adult/Pediatric)  Goal: *STG: Demonstrates ability to understand and use improved judgment in daily activities and relationships  Outcome: Progressing Towards Goal  Patient was received in the dayroom with her visitor. She was upset when a copatient walked into the dayroom. She became loud saying that the copatient was not letting her talk and was disturbing and wanted her out of the dayroom. Staff intervened and she calmed down. She signed over her valuable to her boyfriend. She was redirected when she was getting too close to her B.F in the dayroom. She tends to be loud, needy, overfamiliar and talkative. Needs redirections. Q15min checks maintained.

## 2017-08-06 NOTE — BH NOTES
GROUP THERAPY PROGRESS NOTE    The patient Bettina Lord a 30 y.o. female is participating in Reflections Group    Group time: 30 minutes    Personal goal for participation: To discuss the daily goals    Goal orientation: personal    Group therapy participation: minimal    Therapeutic interventions reviewed and discussed:  Yes    Impression of participation:lori Villarreal  8/5/2017  9:55 PM

## 2017-08-06 NOTE — BH NOTES
PSYCHIATRIC PROGRESS NOTE         Patient Name  Wendy Douglass   Date of Birth 1980   [facility] [id number]   Medical Record Number  [id number]      Age  30 y.o. PCP None   Admit date:  7/21/2017    Room Number  116/74  @ [facility]   Date of Service  8/6/2017          PSYCHOTHERAPY SESSION NOTE:  Length of psychotherapy session: 15 minutes    Main condition/diagnosis/issues treated during session today, 8/6/2017 : compliance coping skills. irritability    I employed Cognitive Behavioral therapy techniques, Reality-Oriented psychotherapy, as well as supportive psychotherapy in regards to various ongoing psychosocial stressors, including the following: pre-admission and current problems; housing issues;occupational issues; academic issues; legal issues; medical issues; and stress of hospitalization. Interpersonal relationship issues and psychodynamic conflicts explored. Attempts made to alleviate maladaptive patterns. We, also, worked on issues of denial & effects of substance dependency/use     Overall, patient is minimally progressing    Treatment Plan Update (reviewed an updated 8/6/2017) : I will modify psychotherapy tx plan by implementing more stress management strategies, building upon cognitive behavioral techniques, increasing coping skills, as well as shoring up psychological defenses). An extended energy and skill set was needed to engage pt in psychotherapy due to some of the following: resistiveness, complexity, negativity, confrontational nature, hostile behaviors, and/or severe abnormalities in thought processes/psychosis resulting in the loss of expressive/receptive language communication skills. E & M PROGRESS NOTE:         HISTORY         HISTORY OF PRESENT ILLNESS/INTERVAL HISTORY:  (reviewed/updated 8/6/2017). CC: \"I am doing perfect\"  \".  Pt admitted under a [facility] long term order (TDO)   for severe psychosis and wendy proving to be/posing an imminent danger to selfand an inability to care for self. HISTORY OF PRESENT ILLNESS:    The patient, Pranay Hopkins, is a 30 y.o. WHITE [place]  female with a past psychiatric history significant for Schizoaffective disorder bipolar type , who presents at this time with complaints of (and/or evidence of) the following emotional symptoms: agitation, psychotic behavior, wendy and psychosis. Additional symptomatology include agitation, difficulty sleeping and increased irritability,she was found wandering around ,confuse,disoritented,unkempt , not wearing shoes or undergarments. The above symptoms have been present for one day . These symptoms are of moderate in  severity. These symptoms are constant  in nature. The patient's condition has been precipitated by and psychosocial stressors . treatment noncompliance. UDS: negative; BAL=0. Patient reported that she has been diagnosed with Schizoaffective disorder, since last 2 years she is not taking any medications and not following any psychiatrist. She reported that she has been tried on Tegretol,depakote, lithium ,haldol ,Risperidone and Zyprexa. She only agree to take [address] presents/reports/evidences the following emotional symptoms today, 8/6/2017:agitation, delusions and wendy. The above symptoms have been present for one day . These symptoms are of moderate in severity. The symptoms are intermittent/ fleeting in nature. Additional symptomatology and features include     7/31- paranoid delusion, intrusive, argumentative towards staff and peer,hyper verbal, disorganized. , repetitive.  Preoccupied with dc.  8/1/17 She slept well last night and she is  Compliant with medications and she still argumentative and she denied  Feeling paranoid and has better insight   8/2/17 she still requesting to leave and she is not feeling she needs to stay and she feels ready and she wants her boyfriend to come to get her , she is compliant with medications and she is less argumentative and not getting angry easily like before but still has pressured speech and denied self harm behavior and denied hearing voices    8/3/17 She still intrusive and getting angry easily and she was able to calm down , she was upset about not leaving today , she stated she slept well last night ands she feels she is ready to go , she has been not feeling as paranoid and no hearing voices but she still hypomanic   8/4/17 she still struggle to follow directions and she still manic and has pressured speech and she is  compliant with her medications and no aggressive behavior  But  She still gets angry and irritable   08/5/17: Seen today,reported doing well,intrusive, irritable , requires frequent redirections, hyper verbal,decreased pressured speech,accepting po medications,sleep and appetite is good,required no prn. Toleratign medications well. SIDE EFFECTS: (reviewed/updated 8/6/2017)  None reported or admitted to. ALLERGIES:(reviewed/updated 8/6/2017)  Allergies   Allergen Reactions    Fish Containing Products Anaphylaxis    Sea Food [Seafood] Anaphylaxis      MEDICATIONS PRIOR TO ADMISSION:(reviewed/updated 8/6/2017)  Prescriptions Prior to Admission   Medication Sig    carBAMazepine XR (TEGRETOL XR) 100 mg SR tablet Take 100 mg by mouth two (2) times a day. Indications: Ana associated with Bipolar Disorder, MIXED BIPOLAR I DISORDER      PAST MEDICAL HISTORY: Past medical history from the initial psychiatric evaluation has been reviewed (reviewed/updated 8/6/2017) with no additional updates (I asked patient and no additional past medical history provided).    Past Medical History:   Diagnosis Date    Anxiety disorder     Depression     Hypertension     Psychotic disorder      Past Surgical History:   Procedure Laterality Date    HX GYN        SOCIAL HISTORY: Social history from the initial psychiatric evaluation has been reviewed (reviewed/updated 8/6/2017) with no additional updates (I asked patient and no additional social history provided). Social History     Social History    Marital status: SINGLE     Spouse name: N/A    Number of children: N/A    Years of education: N/A     Occupational History    Not on file. Social History Main Topics    Smoking status: Never Smoker    Smokeless tobacco: Never Used    Alcohol use No    Drug use: No    Sexual activity: Yes     Other Topics Concern    Not on file     Social History Narrative    No narrative on file      FAMILY HISTORY: Family history from the initial psychiatric evaluation has been reviewed (reviewed/updated 8/6/2017) with no additional updates (I asked patient and no additional family history provided). History reviewed. No pertinent family history. REVIEW OF SYSTEMS: (reviewed/updated 8/6/2017)  Appetite:improved   Sleep: 7 hours    All other Review of Systems: Psychological ROS: positive for - hostility, irritability and mood swings  Respiratory ROS: no cough, shortness of breath, or wheezing  Cardiovascular ROS: no chest pain or dyspnea on exertion         [facility] (MSE):    [facility] FINDINGS ARE WITHIN NORMAL LIMITS (WNL) UNLESS OTHERWISE STATED BELOW. ( ALL OF THE BELOW CATEGORIES OF THE MSE HAVE BEEN REVIEWED (reviewed 8/6/2017) AND UPDATED AS DEEMED APPROPRIATE )  General Presentation age appropriate and casually dressed,  unco operative, unreliable   Orientation oriented to time, place and person   Vital Signs  See below (reviewed 8/6/2017); Vital Signs (BP, Pulse, & Temp) are within normal limits if not listed below.    Gait and Station Stable/steady, no ataxia   Musculoskeletal System No extrapyramidal symptoms (EPS); no abnormal muscular movements or Tardive Dyskinesia (TD); muscle strength and tone are within normal limits   Language No aphasia or dysarthria   Speech:  hyperverbal, Pressured   Thought Processes logical; fast rate of thoughts; poor abstract reasoning/computation   Thought Associations tangential   Thought Content Paranoid delusion, free of hallucination   Suicidal Ideations no plan  and no intention   Homicidal Ideations none   Mood:   Irritable ,   Affect:  Less labile,less agitated   Memory recent  intact   Memory remote:  intact   Concentration/Attention:  intact   Fund of Knowledge average   Insight:  limited   Reliability poor   Judgment:  poor          VITALS:     Patient Vitals for the past 24 hrs:   Temp Pulse Resp BP   08/06/17 1645 98.7 °F (37.1 °C) 89 18 136/83   08/06/17 0600 97.1 °F (36.2 °C) 91 18 (!) 104/92     Wt Readings from Last 3 Encounters:   07/21/17 68 kg (150 lb)     Temp Readings from Last 3 Encounters:   08/06/17 98.7 °F (37.1 °C)     BP Readings from Last 3 Encounters:   08/06/17 136/83     Pulse Readings from Last 3 Encounters:   08/06/17 89            DATA     LABORATORY DATA:(reviewed/updated 8/6/2017)  No results found for this or any previous visit (from the past 24 hour(s)). Lab Results   Component Value Date/Time    Valproic acid 97 08/01/2017 04:46 AM     No results found for: LITHM   RADIOLOGY REPORTS:(reviewed/updated 8/6/2017)  No results found.        MEDICATIONS     ALL MEDICATIONS:   Current Facility-Administered Medications   Medication Dose Route Frequency    OLANZapine (ZyPREXA) tablet 30 mg  30 mg Oral QHS    zolpidem (AMBIEN) tablet 5 mg  5 mg Oral QHS PRN    divalproex ER (DEPAKOTE ER) 24 hour tablet 1,500 mg  1,500 mg Oral QHS    Or    LORazepam (ATIVAN) injection 1 mg +++COURT ORDERED MEDICATION FOR REFUSAL OF PO DEPAKOTE+++  1 mg IntraMUSCular QHS    clonazePAM (KlonoPIN) tablet 1 mg  1 mg Oral BID    ziprasidone (GEODON) 20 mg in sterile water (preservative free) 1 mL injection  20 mg IntraMUSCular BID PRN    OLANZapine (ZyPREXA) tablet 5 mg  5 mg Oral Q6H PRN    benztropine (COGENTIN) tablet 2 mg  2 mg Oral BID PRN    benztropine (COGENTIN) injection 2 mg  2 mg IntraMUSCular BID PRN    LORazepam (ATIVAN) injection 2 mg  2 mg IntraMUSCular Q4H PRN    LORazepam (ATIVAN) tablet 1 mg  1 mg Oral Q4H PRN    acetaminophen (TYLENOL) tablet 650 mg  650 mg Oral Q4H PRN    ibuprofen (MOTRIN) tablet 400 mg  400 mg Oral Q8H PRN    magnesium hydroxide (MILK OF MAGNESIA) 400 mg/5 mL oral suspension 30 mL  30 mL Oral DAILY PRN    nicotine (NICODERM CQ) 21 mg/24 hr patch 1 Patch  1 Patch TransDERmal DAILY PRN      SCHEDULED MEDICATIONS:   Current Facility-Administered Medications   Medication Dose Route Frequency    OLANZapine (ZyPREXA) tablet 30 mg  30 mg Oral QHS    divalproex ER (DEPAKOTE ER) 24 hour tablet 1,500 mg  1,500 mg Oral QHS    Or    LORazepam (ATIVAN) injection 1 mg +++COURT ORDERED MEDICATION FOR REFUSAL OF PO DEPAKOTE+++  1 mg IntraMUSCular QHS    clonazePAM (KlonoPIN) tablet 1 mg  1 mg Oral BID          ASSESSMENT & PLAN     DIAGNOSES REQUIRING ACTIVE TREATMENT AND MONITORING: (reviewed/updated 8/6/2017)  Patient Active Hospital Problem List:   Schizoaffective disorder, chronic condition with acute exacerbation ([facility] [address] (7/22/2017)      Assessment: minimal change- intrusive, labile, psychotic    Plan: I will ct to adjust med- Zyprexa, Depakote,Klonopin. Depakote level due on Tue     Noncompliance with treatment (7/22/2017)    Assessment: due to psychosis- court order med- now accepting med    Plan: Educate about the need for medications ,consider Long Acting Antipsychotic med. 8/1/17 will continue same treatment plan   8/2/17 increase her Zyprexa by 5 mg po daily to address her pressured speech and her mood   8/3/17 will continue same medications  8/4/17 Continue treatment plan    8/5/17: Continue current treatment,porvdie supportive therapies  08/6/17: Continue current treatment. Still intrusive and mood instability   In summary, Gayr Simpson, is a 30 y.o.  female who presents with a severe exacerbation of the principal diagnosis of Schizoaffective disorder, chronic condition with acute exacerbation ([facility] [address]     Patient's condition is not stable . Patient requires continued inpatient hospitalization for further stabilization, safety monitoring and medication management. I will continue to coordinate the provision of individual, milieu, occupational, group, and substance abuse therapies to address target symptoms/diagnoses as deemed appropriate for the individual patient. A coordinated, multidisplinary treatment team round was conducted with the patient (this team consists of the nurse, psychiatric unit pharmcist,  and writer). Complete current electronic health record for patient has been reviewed today including consultant notes, ancillary staff notes, nurses and psychiatric tech notes. Suicide risk assessment completed and patient deemed to be of low risk for suicide at this time. The following regarding medications was addressed during rounds with patient:   the risks and benefits of the proposed medication. The patient was given the opportunity to ask questions. Informed consent given to the use of the above medications. Will continue to adjust psychiatric and non-psychiatric medications (see above \"medication\" section and orders section for details) as deemed appropriate & based upon diagnoses and response to treatment. I will continue to order blood tests/labs and diagnostic tests as deemed appropriate and review results as they become available (see orders for details and above listed lab/test results). I will order psychiatric records from previous [facility] hospitals to further elucidate the nature of patient's psychopathology and review once available. I will gather additional collateral information from friends, family and o/p treatment team to further elucidate the nature of patient's psychopathology and baselline level of psychiatric functioning.          I certify that this patient's inpatient psychiatric hospital services furnished since the previous certification were, and continue to be, required for treatment that could reasonably be expected to improve the patient's condition, or for diagnostic study, and that the patient continues to need, on a daily basis, active treatment furnished directly by or requiring the supervision of inpatient psychiatric facility personnel. In addition, the hospital records show that services furnished were intensive treatment services, admission or related services, or equivalent services.     EXPECTED DISCHARGE DATE/DAY: TBD     DISPOSITION:    Home       Signed By:   Dima Burton MD  8/6/2017

## 2017-08-06 NOTE — PROGRESS NOTES
Problem: Manic Behavior (Adult/Pediatric)  Goal: *STG: Demonstrates improvement in thought process and speech pattern  Outcome: Progressing Towards Goal  Pt remains labile, loud, irritable and demanding. Pt continues to argue with her peers and staff when limits are set on her inappropriate behavior. She continues to challenge her peers and staff. Pt remains hyperverbal and disruptive. Pt continues to provoke others. She remains restless, paranoid and agitated. Pt was offered prn medication but she declined. Reinforce reality and encouraged more appropriate behavior. Will continue to monitor closely.

## 2017-08-07 PROCEDURE — 65220000003 HC RM SEMIPRIVATE PSYCH

## 2017-08-07 PROCEDURE — 74011250637 HC RX REV CODE- 250/637: Performed by: PSYCHIATRY & NEUROLOGY

## 2017-08-07 RX ORDER — CLONAZEPAM 1 MG/1
1 TABLET ORAL
Status: DISCONTINUED | OUTPATIENT
Start: 2017-08-07 | End: 2017-08-10 | Stop reason: HOSPADM

## 2017-08-07 RX ORDER — OLANZAPINE 10 MG/1
10 TABLET ORAL EVERY EVENING
Status: DISCONTINUED | OUTPATIENT
Start: 2017-08-07 | End: 2017-08-08

## 2017-08-07 RX ADMIN — CLONAZEPAM 1 MG: 1 TABLET ORAL at 21:19

## 2017-08-07 RX ADMIN — OLANZAPINE 30 MG: 10 TABLET, FILM COATED ORAL at 21:20

## 2017-08-07 RX ADMIN — LORAZEPAM 1 MG: 1 TABLET ORAL at 15:18

## 2017-08-07 RX ADMIN — OLANZAPINE 10 MG: 10 TABLET, FILM COATED ORAL at 17:46

## 2017-08-07 RX ADMIN — DIVALPROEX SODIUM 1500 MG: 500 TABLET, FILM COATED, EXTENDED RELEASE ORAL at 21:20

## 2017-08-07 RX ADMIN — CLONAZEPAM 1 MG: 1 TABLET ORAL at 08:29

## 2017-08-07 RX ADMIN — IBUPROFEN 400 MG: 400 TABLET, FILM COATED ORAL at 10:06

## 2017-08-07 RX ADMIN — OLANZAPINE 5 MG: 5 TABLET, FILM COATED ORAL at 09:07

## 2017-08-07 NOTE — BH NOTES
GROUP THERAPY PROGRESS NOTE    The patient Judith hartman 30 y.o. female is participating in Creative Expression Group. Group time: 1 hour    Personal goal for participation: To concentrate on selected task    Goal orientation: social    Group therapy participation: active    Therapeutic interventions reviewed and discussed: Crafts, games, music    Impression of participation: The patient was attentive.     Carlie Loving  8/7/2017  5:22 PM

## 2017-08-07 NOTE — BH NOTES
GROUP THERAPY PROGRESS NOTE    The patient Stanislaw hartman 30 y.o. female is participating in Coping Skills Group.      Group time: 45 minutes    Personal goal for participation: To develop a personal plan for success    Goal orientation:  personal    Group therapy participation: active    Therapeutic interventions reviewed and discussed: positive coping strategies/goals    Impression of participation:  The patient was attentive-required prompting    Billy Garcia  8/7/2017  4:53 PM

## 2017-08-07 NOTE — BH NOTES
SOCIAL WORK NOTE:  Pt did not attend processing group.        LEO Garrison, Supervisee in Social Work

## 2017-08-07 NOTE — BH NOTES
Patient remains liable loud,intrusive at times still focusing , phone and discharge;insight remains poor. Will continue Q15min. Safety monitoring and encourage medication compliance.

## 2017-08-07 NOTE — BH NOTES
PSYCHIATRIC PROGRESS NOTE         Patient Name  Mago Yung   Date of Birth 1980   [facility] [id number]   Medical Record Number  [id number]      Age  30 y.o. PCP None   Admit date:  7/21/2017    Room Number  985/55  @ [facility]   Date of Service  8/7/2017          PSYCHOTHERAPY SESSION NOTE:  Length of psychotherapy session: 15 minutes    Main condition/diagnosis/issues treated during session today, 8/7/2017 : compliance coping skills. irritability    I employed Cognitive Behavioral therapy techniques, Reality-Oriented psychotherapy, as well as supportive psychotherapy in regards to various ongoing psychosocial stressors, including the following: pre-admission and current problems; housing issues;occupational issues; academic issues; legal issues; medical issues; and stress of hospitalization. Interpersonal relationship issues and psychodynamic conflicts explored. Attempts made to alleviate maladaptive patterns. We, also, worked on issues of denial & effects of substance dependency/use     Overall, patient is minimally progressing    Treatment Plan Update (reviewed an updated 8/7/2017) : I will modify psychotherapy tx plan by implementing more stress management strategies, building upon cognitive behavioral techniques, increasing coping skills, as well as shoring up psychological defenses). An extended energy and skill set was needed to engage pt in psychotherapy due to some of the following: resistiveness, complexity, negativity, confrontational nature, hostile behaviors, and/or severe abnormalities in thought processes/psychosis resulting in the loss of expressive/receptive language communication skills. E & M PROGRESS NOTE:         HISTORY         HISTORY OF PRESENT ILLNESS/INTERVAL HISTORY:  (reviewed/updated 8/7/2017). CC: \"I am doing perfect\"  \".  Pt admitted under a [facility] long-term order (TDO)   for severe psychosis and wendy proving to be/posing an imminent danger to selfand an inability to care for self. HISTORY OF PRESENT ILLNESS:    The patient, Brandie Ordonez, is a 30 y.o. WHITE [place]  female with a past psychiatric history significant for Schizoaffective disorder bipolar type , who presents at this time with complaints of (and/or evidence of) the following emotional symptoms: agitation, psychotic behavior, wendy and psychosis. Additional symptomatology include agitation, difficulty sleeping and increased irritability,she was found wandering around ,confuse,disoritented,unkempt , not wearing shoes or undergarments. The above symptoms have been present for one day . These symptoms are of moderate in  severity. These symptoms are constant  in nature. The patient's condition has been precipitated by and psychosocial stressors . treatment noncompliance. UDS: negative; BAL=0. Patient reported that she has been diagnosed with Schizoaffective disorder, since last 2 years she is not taking any medications and not following any psychiatrist. She reported that she has been tried on Tegretol,depakote, lithium ,haldol ,Risperidone and Zyprexa. She only agree to take [address] presents/reports/evidences the following emotional symptoms today, 8/7/2017:agitation, delusions and wendy. The above symptoms have been present for one day . These symptoms are of moderate in severity. The symptoms are intermittent/ fleeting in nature. Additional symptomatology and features include labile, intrusive, loud with pressured speech. Disorganized thought process. Limited insight. Irritable mood      SIDE EFFECTS: (reviewed/updated 8/7/2017)  None reported or admitted to.      ALLERGIES:(reviewed/updated 8/7/2017)  Allergies   Allergen Reactions    Fish Containing Products Anaphylaxis    Sea Food [Seafood] Anaphylaxis      MEDICATIONS PRIOR TO ADMISSION:(reviewed/updated 8/7/2017)  Prescriptions Prior to Admission Medication Sig    carBAMazepine XR (TEGRETOL XR) 100 mg SR tablet Take 100 mg by mouth two (2) times a day. Indications: Ana associated with Bipolar Disorder, MIXED BIPOLAR I DISORDER      PAST MEDICAL HISTORY: Past medical history from the initial psychiatric evaluation has been reviewed (reviewed/updated 8/7/2017) with no additional updates (I asked patient and no additional past medical history provided). Past Medical History:   Diagnosis Date    Anxiety disorder     Depression     Hypertension     Psychotic disorder      Past Surgical History:   Procedure Laterality Date    HX GYN        SOCIAL HISTORY: Social history from the initial psychiatric evaluation has been reviewed (reviewed/updated 8/7/2017) with no additional updates (I asked patient and no additional social history provided). Social History     Social History    Marital status: SINGLE     Spouse name: N/A    Number of children: N/A    Years of education: N/A     Occupational History    Not on file. Social History Main Topics    Smoking status: Never Smoker    Smokeless tobacco: Never Used    Alcohol use No    Drug use: No    Sexual activity: Yes     Other Topics Concern    Not on file     Social History Narrative    No narrative on file      FAMILY HISTORY: Family history from the initial psychiatric evaluation has been reviewed (reviewed/updated 8/7/2017) with no additional updates (I asked patient and no additional family history provided). History reviewed. No pertinent family history.     REVIEW OF SYSTEMS: (reviewed/updated 8/7/2017)  Appetite:improved   Sleep: 7 hours    All other Review of Systems: Psychological ROS: positive for - hostility, irritability and mood swings  Respiratory ROS: no cough, shortness of breath, or wheezing  Cardiovascular ROS: no chest pain or dyspnea on exertion         [facility] (MSE):    MSE FINDINGS ARE WITHIN NORMAL LIMITS (WNL) UNLESS OTHERWISE STATED BELOW. ( ALL OF THE BELOW CATEGORIES OF THE MSE HAVE BEEN REVIEWED (reviewed 8/7/2017) AND UPDATED AS DEEMED APPROPRIATE )  General Presentation age appropriate and casually dressed,  Un Co operative, unreliable   Orientation oriented to time, place and person   Vital Signs  See below (reviewed 8/7/2017); Vital Signs (BP, Pulse, & Temp) are within normal limits if not listed below. Gait and Station Stable/steady, no ataxia   Musculoskeletal System No extrapyramidal symptoms (EPS); no abnormal muscular movements or Tardive Dyskinesia (TD); muscle strength and tone are within normal limits   Language No aphasia or dysarthria   Speech:  hyperverbal, Pressured   Thought Processes logical; fast rate of thoughts; poor abstract reasoning/computation   Thought Associations tangential,   Thought Content Paranoid delusion, free of hallucination   Suicidal Ideations no plan  and no intention   Homicidal Ideations none   Mood:   Irritable ,   Affect:   labile,less agitated   Memory recent  intact   Memory remote:  intact   Concentration/Attention:  intact   Fund of Knowledge average   Insight:  limited   Reliability poor   Judgment:  poor          VITALS:     Patient Vitals for the past 24 hrs:   Temp Pulse Resp BP   08/06/17 1645 98.7 °F (37.1 °C) 89 18 136/83     Wt Readings from Last 3 Encounters:   07/21/17 68 kg (150 lb)     Temp Readings from Last 3 Encounters:   08/06/17 98.7 °F (37.1 °C)     BP Readings from Last 3 Encounters:   08/06/17 136/83     Pulse Readings from Last 3 Encounters:   08/06/17 89            DATA     LABORATORY DATA:(reviewed/updated 8/7/2017)  No results found for this or any previous visit (from the past 24 hour(s)). Lab Results   Component Value Date/Time    Valproic acid 97 08/01/2017 04:46 AM     No results found for: LITHM   RADIOLOGY REPORTS:(reviewed/updated 8/7/2017)  No results found.        MEDICATIONS     ALL MEDICATIONS:   Current Facility-Administered Medications   Medication Dose Route Frequency    OLANZapine (ZyPREXA) tablet 30 mg  30 mg Oral QHS    zolpidem (AMBIEN) tablet 5 mg  5 mg Oral QHS PRN    divalproex ER (DEPAKOTE ER) 24 hour tablet 1,500 mg  1,500 mg Oral QHS    Or    LORazepam (ATIVAN) injection 1 mg +++COURT ORDERED MEDICATION FOR REFUSAL OF PO DEPAKOTE+++  1 mg IntraMUSCular QHS    clonazePAM (KlonoPIN) tablet 1 mg  1 mg Oral BID    ziprasidone (GEODON) 20 mg in sterile water (preservative free) 1 mL injection  20 mg IntraMUSCular BID PRN    OLANZapine (ZyPREXA) tablet 5 mg  5 mg Oral Q6H PRN    benztropine (COGENTIN) tablet 2 mg  2 mg Oral BID PRN    benztropine (COGENTIN) injection 2 mg  2 mg IntraMUSCular BID PRN    LORazepam (ATIVAN) injection 2 mg  2 mg IntraMUSCular Q4H PRN    LORazepam (ATIVAN) tablet 1 mg  1 mg Oral Q4H PRN    acetaminophen (TYLENOL) tablet 650 mg  650 mg Oral Q4H PRN    ibuprofen (MOTRIN) tablet 400 mg  400 mg Oral Q8H PRN    magnesium hydroxide (MILK OF MAGNESIA) 400 mg/5 mL oral suspension 30 mL  30 mL Oral DAILY PRN    nicotine (NICODERM CQ) 21 mg/24 hr patch 1 Patch  1 Patch TransDERmal DAILY PRN      SCHEDULED MEDICATIONS:   Current Facility-Administered Medications   Medication Dose Route Frequency    OLANZapine (ZyPREXA) tablet 30 mg  30 mg Oral QHS    divalproex ER (DEPAKOTE ER) 24 hour tablet 1,500 mg  1,500 mg Oral QHS    Or    LORazepam (ATIVAN) injection 1 mg +++COURT ORDERED MEDICATION FOR REFUSAL OF PO DEPAKOTE+++  1 mg IntraMUSCular QHS    clonazePAM (KlonoPIN) tablet 1 mg  1 mg Oral BID          ASSESSMENT & PLAN     DIAGNOSES REQUIRING ACTIVE TREATMENT AND MONITORING: (reviewed/updated 8/7/2017)  Patient Active Hospital Problem List:   Schizoaffective disorder, chronic condition with acute exacerbation ([facility] [address] (7/22/2017)      Assessment: minimal change- intrusive, labile, delusion    Plan: I will ct to adjust med- Zyprexa, Depakote, will titrate     Noncompliance with treatment (7/22/2017)    Assessment: due to psychosis- court order med- now accepting med    Plan: Educate about the need for medications ,consider Long Acting Antipsychotic med. In summary, Pranay Hopkins, is a 30 y.o.  female who presents with a severe exacerbation of the principal diagnosis of Schizoaffective disorder, chronic condition with acute exacerbation ([facility] [address]     Patient's condition is not stable . Patient requires continued inpatient hospitalization for further stabilization, safety monitoring and medication management. I will continue to coordinate the provision of individual, milieu, occupational, group, and substance abuse therapies to address target symptoms/diagnoses as deemed appropriate for the individual patient. A coordinated, multidisplinary treatment team round was conducted with the patient (this team consists of the nurse, psychiatric unit pharmcist,  and writer). Complete current electronic health record for patient has been reviewed today including consultant notes, ancillary staff notes, nurses and psychiatric tech notes. Suicide risk assessment completed and patient deemed to be of low risk for suicide at this time. The following regarding medications was addressed during rounds with patient:   the risks and benefits of the proposed medication. The patient was given the opportunity to ask questions. Informed consent given to the use of the above medications. Will continue to adjust psychiatric and non-psychiatric medications (see above \"medication\" section and orders section for details) as deemed appropriate & based upon diagnoses and response to treatment. I will continue to order blood tests/labs and diagnostic tests as deemed appropriate and review results as they become available (see orders for details and above listed lab/test results).     I will order psychiatric records from previous [facility] hospitals to further elucidate the nature of patient's psychopathology and review once available. I will gather additional collateral information from friends, family and o/p treatment team to further elucidate the nature of patient's psychopathology and baselline level of psychiatric functioning. I certify that this patient's inpatient psychiatric hospital services furnished since the previous certification were, and continue to be, required for treatment that could reasonably be expected to improve the patient's condition, or for diagnostic study, and that the patient continues to need, on a daily basis, active treatment furnished directly by or requiring the supervision of inpatient psychiatric facility personnel. In addition, the hospital records show that services furnished were intensive treatment services, admission or related services, or equivalent services.     EXPECTED DISCHARGE DATE/DAY: TBD     DISPOSITION:    Home       Signed By:   Rhonda Owusu MD  8/7/2017

## 2017-08-08 PROCEDURE — 65220000003 HC RM SEMIPRIVATE PSYCH

## 2017-08-08 PROCEDURE — 74011250637 HC RX REV CODE- 250/637: Performed by: PSYCHIATRY & NEUROLOGY

## 2017-08-08 RX ORDER — OLANZAPINE 10 MG/1
10 TABLET ORAL DAILY
Status: DISCONTINUED | OUTPATIENT
Start: 2017-08-08 | End: 2017-08-10 | Stop reason: HOSPADM

## 2017-08-08 RX ADMIN — CLONAZEPAM 1 MG: 1 TABLET ORAL at 21:31

## 2017-08-08 RX ADMIN — LORAZEPAM 1 MG: 1 TABLET ORAL at 17:30

## 2017-08-08 RX ADMIN — OLANZAPINE 30 MG: 10 TABLET, FILM COATED ORAL at 21:31

## 2017-08-08 RX ADMIN — DIVALPROEX SODIUM 1500 MG: 500 TABLET, FILM COATED, EXTENDED RELEASE ORAL at 21:31

## 2017-08-08 RX ADMIN — OLANZAPINE 10 MG: 10 TABLET, FILM COATED ORAL at 09:21

## 2017-08-08 RX ADMIN — LORAZEPAM 1 MG: 1 TABLET ORAL at 08:58

## 2017-08-08 NOTE — BH NOTES
GROUP THERAPY PROGRESS NOTE    The patient Gemma hartman 30 y.o. female is participating in Coping Skills Group. Group time: 45 minutes    Personal goal for participation: To participate in mental health journey game    Goal orientation:  personal    Group therapy participation: active    Therapeutic interventions reviewed and discussed: choices in recovery    Impression of participation:  The patient was attentive.     Carin Loving  8/8/2017  1:41 PM

## 2017-08-08 NOTE — BH NOTES
GROUP THERAPY PROGRESS NOTE    Mariaelena Quiles is participating in [employer].      Group time: 30 minutes    Personal goal for participation:  Unit orientation    Goal orientation: Community    Group therapy participation: active    Therapeutic interventions reviewed and discussed: Yes    Impression of participation: good

## 2017-08-08 NOTE — BH NOTES
Pt continues to be loud loose and intrusive. Pt was redirected several times on her tone and intrusiveness. Pt stated she was going to try to display self control because she wants to be discharged before the weekend. Pt will continue to be monitored for safety.

## 2017-08-08 NOTE — BH NOTES
Pt focused on phone calls and discharge. Hyperverbal.Ativan 1mg given PO. Will monitor medication effectiveness and assess needs. 0945-Medication deemed effective. Pt was less hyperverbal and easily redirected. Will encourage pt to continue good behavior.

## 2017-08-08 NOTE — BH NOTES
GROUP THERAPY PROGRESS NOTE    Katrin Alexander is participating in Process Group. Group time: 30 minutes    Personal goal for participation: Identify existing and new ways to promote self-love    Goal orientation: personal    Group therapy participation: disruptive    Therapeutic interventions reviewed and discussed:   Topic: [address]   Pt reported feeling fine and happy. She stated that she does  not communicate with other pt, only doctors and nurse. Pt was addiment that she did not talk in groups, but often spoke off-topic disrupting discussion. Impression of participation:   Pt presented with a full affect and labile mood. She was a disruptive participant and did not contributed to discussion. Pts behavior was disruptive due to interrupting others while they spoke, always speaking off-topic, and being argumentative even when given positive feedback. Her thoughts demonstrated self-centeredness, contrarian statements, blaming, and lack of insight.      LEO Garvin, Supervisee in Social Work

## 2017-08-08 NOTE — BH NOTES
PSYCHIATRIC PROGRESS NOTE         Patient Name  Karena Carlisle   Date of Birth 1980   [facility] [id number]   Medical Record Number  [id number]      Age  30 y.o. PCP None   Admit date:  7/21/2017    Room Number  858/42  @ [facility]   Date of Service  8/8/2017          PSYCHOTHERAPY SESSION NOTE:  Length of psychotherapy session: 15 minutes    Main condition/diagnosis/issues treated during session today, 8/8/2017 : compliance coping skills. irritability    I employed Cognitive Behavioral therapy techniques, Reality-Oriented psychotherapy, as well as supportive psychotherapy in regards to various ongoing psychosocial stressors, including the following: pre-admission and current problems; housing issues;occupational issues; academic issues; legal issues; medical issues; and stress of hospitalization. Interpersonal relationship issues and psychodynamic conflicts explored. Attempts made to alleviate maladaptive patterns. We, also, worked on issues of denial & effects of substance dependency/use     Overall, patient is minimally progressing    Treatment Plan Update (reviewed an updated 8/8/2017) : I will modify psychotherapy tx plan by implementing more stress management strategies, building upon cognitive behavioral techniques, increasing coping skills, as well as shoring up psychological defenses). An extended energy and skill set was needed to engage pt in psychotherapy due to some of the following: resistiveness, complexity, negativity, confrontational nature, hostile behaviors, and/or severe abnormalities in thought processes/psychosis resulting in the loss of expressive/receptive language communication skills. E & M PROGRESS NOTE:         HISTORY         HISTORY OF PRESENT ILLNESS/INTERVAL HISTORY:  (reviewed/updated 8/8/2017). CC: \"I am doing perfect\"  \".  Pt admitted under a [facility] order (TDO)   for severe psychosis and wendy proving to be/posing an imminent danger to selfand an inability to care for self. HISTORY OF PRESENT ILLNESS:    The patient, Nanette Joshua, is a 30 y.o. WHITE [place]  female with a past psychiatric history significant for Schizoaffective disorder bipolar type , who presents at this time with complaints of (and/or evidence of) the following emotional symptoms: agitation, psychotic behavior, wendy and psychosis. Additional symptomatology include agitation, difficulty sleeping and increased irritability,she was found wandering around ,confuse,disoritented,unkempt , not wearing shoes or undergarments. The above symptoms have been present for one day . These symptoms are of moderate in  severity. These symptoms are constant  in nature. The patient's condition has been precipitated by and psychosocial stressors . treatment noncompliance. UDS: negative; BAL=0. Patient reported that she has been diagnosed with Schizoaffective disorder, since last 2 years she is not taking any medications and not following any psychiatrist. She reported that she has been tried on Tegretol,depakote, lithium ,haldol ,Risperidone and Zyprexa. She only agree to take [address] presents/reports/evidences the following emotional symptoms today, 8/8/2017:agitation, delusions and wendy. The above symptoms have been present for one day . These symptoms are of moderate in severity. The symptoms are intermittent/ fleeting in nature. Additional symptomatology and features include labile, intrusive, loud with pressured speech. Disorganized thought process. Limited insight. Irritable mood. Started well but gets derailed , disorganized as the interview progress further, need several redirection to stay on topic. SIDE EFFECTS: (reviewed/updated 8/8/2017)  None reported or admitted to.      ALLERGIES:(reviewed/updated 8/8/2017)  Allergies   Allergen Reactions    Fish Containing Products Anaphylaxis    Sea Food [Seafood] Anaphylaxis      MEDICATIONS PRIOR TO ADMISSION:(reviewed/updated 8/8/2017)  Prescriptions Prior to Admission   Medication Sig    carBAMazepine XR (TEGRETOL XR) 100 mg SR tablet Take 100 mg by mouth two (2) times a day. Indications: Ana associated with Bipolar Disorder, MIXED BIPOLAR I DISORDER      PAST MEDICAL HISTORY: Past medical history from the initial psychiatric evaluation has been reviewed (reviewed/updated 8/8/2017) with no additional updates (I asked patient and no additional past medical history provided). Past Medical History:   Diagnosis Date    Anxiety disorder     Depression     Hypertension     Psychotic disorder      Past Surgical History:   Procedure Laterality Date    HX GYN        SOCIAL HISTORY: Social history from the initial psychiatric evaluation has been reviewed (reviewed/updated 8/8/2017) with no additional updates (I asked patient and no additional social history provided). Social History     Social History    Marital status: SINGLE     Spouse name: N/A    Number of children: N/A    Years of education: N/A     Occupational History    Not on file. Social History Main Topics    Smoking status: Never Smoker    Smokeless tobacco: Never Used    Alcohol use No    Drug use: No    Sexual activity: Yes     Other Topics Concern    Not on file     Social History Narrative    No narrative on file      FAMILY HISTORY: Family history from the initial psychiatric evaluation has been reviewed (reviewed/updated 8/8/2017) with no additional updates (I asked patient and no additional family history provided). History reviewed. No pertinent family history.     REVIEW OF SYSTEMS: (reviewed/updated 8/8/2017)  Appetite:improved   Sleep: 7 hours    All other Review of Systems: Psychological ROS: positive for - hostility, irritability and mood swings  Respiratory ROS: no cough, shortness of breath, or wheezing  Cardiovascular ROS: no chest pain or dyspnea on exertion MENTAL STATUS EXAM & VITALS     MENTAL STATUS EXAM (MSE):    MSE FINDINGS ARE WITHIN NORMAL LIMITS (WNL) UNLESS OTHERWISE STATED BELOW. ( ALL OF THE BELOW CATEGORIES OF THE MSE HAVE BEEN REVIEWED (reviewed 8/8/2017) AND UPDATED AS DEEMED APPROPRIATE )  General Presentation age appropriate and casually dressed,  Un Co operative, unreliable   Orientation oriented to time, place and person   Vital Signs  See below (reviewed 8/8/2017); Vital Signs (BP, Pulse, & Temp) are within normal limits if not listed below. Gait and Station Stable/steady, no ataxia   Musculoskeletal System No extrapyramidal symptoms (EPS); no abnormal muscular movements or Tardive Dyskinesia (TD); muscle strength and tone are within normal limits   Language No aphasia or dysarthria   Speech:  hyperverbal, Pressured   Thought Processes logical; fast rate of thoughts; poor abstract reasoning/computation   Thought Associations tangential,   Thought Content Paranoid delusion, free of hallucination   Suicidal Ideations no plan  and no intention   Homicidal Ideations none   Mood:   Irritable ,   Affect:   labile,less agitated   Memory recent  intact   Memory remote:  intact   Concentration/Attention:  intact   Fund of Knowledge average   Insight:  limited   Reliability poor   Judgment:  poor          VITALS:     Patient Vitals for the past 24 hrs:   Temp Pulse Resp BP   08/07/17 2210 - 86 18 120/75   08/07/17 1309 96.5 °F (35.8 °C) 83 18 121/80     Wt Readings from Last 3 Encounters:   07/21/17 68 kg (150 lb)     Temp Readings from Last 3 Encounters:   08/07/17 96.5 °F (35.8 °C)     BP Readings from Last 3 Encounters:   08/07/17 120/75     Pulse Readings from Last 3 Encounters:   08/07/17 86            DATA     LABORATORY DATA:(reviewed/updated 8/8/2017)  No results found for this or any previous visit (from the past 24 hour(s)).   Lab Results   Component Value Date/Time    Valproic acid 97 08/01/2017 04:46 AM     No results found for: [facility] [place] [facility] REPORTS:(reviewed/updated 8/8/2017)  No results found.        MEDICATIONS     ALL MEDICATIONS:   Current Facility-Administered Medications   Medication Dose Route Frequency    OLANZapine (ZyPREXA) tablet 10 mg  10 mg Oral DAILY    clonazePAM (KlonoPIN) tablet 1 mg  1 mg Oral QHS    OLANZapine (ZyPREXA) tablet 30 mg  30 mg Oral QHS    zolpidem (AMBIEN) tablet 5 mg  5 mg Oral QHS PRN    divalproex ER (DEPAKOTE ER) 24 hour tablet 1,500 mg  1,500 mg Oral QHS    Or    LORazepam (ATIVAN) injection 1 mg +++COURT ORDERED MEDICATION FOR REFUSAL OF PO DEPAKOTE+++  1 mg IntraMUSCular QHS    ziprasidone (GEODON) 20 mg in sterile water (preservative free) 1 mL injection  20 mg IntraMUSCular BID PRN    OLANZapine (ZyPREXA) tablet 5 mg  5 mg Oral Q6H PRN    benztropine (COGENTIN) tablet 2 mg  2 mg Oral BID PRN    benztropine (COGENTIN) injection 2 mg  2 mg IntraMUSCular BID PRN    LORazepam (ATIVAN) injection 2 mg  2 mg IntraMUSCular Q4H PRN    LORazepam (ATIVAN) tablet 1 mg  1 mg Oral Q4H PRN    acetaminophen (TYLENOL) tablet 650 mg  650 mg Oral Q4H PRN    ibuprofen (MOTRIN) tablet 400 mg  400 mg Oral Q8H PRN    magnesium hydroxide (MILK OF MAGNESIA) 400 mg/5 mL oral suspension 30 mL  30 mL Oral DAILY PRN    nicotine (NICODERM CQ) 21 mg/24 hr patch 1 Patch  1 Patch TransDERmal DAILY PRN      SCHEDULED MEDICATIONS:   Current Facility-Administered Medications   Medication Dose Route Frequency    OLANZapine (ZyPREXA) tablet 10 mg  10 mg Oral DAILY    clonazePAM (KlonoPIN) tablet 1 mg  1 mg Oral QHS    OLANZapine (ZyPREXA) tablet 30 mg  30 mg Oral QHS    divalproex ER (DEPAKOTE ER) 24 hour tablet 1,500 mg  1,500 mg Oral QHS    Or    LORazepam (ATIVAN) injection 1 mg +++COURT ORDERED MEDICATION FOR REFUSAL OF PO DEPAKOTE+++  1 mg IntraMUSCular QHS          ASSESSMENT & PLAN     DIAGNOSES REQUIRING ACTIVE TREATMENT AND MONITORING: (reviewed/updated 8/8/2017)  Patient Active Hospital Problem List: Schizoaffective disorder, chronic condition with acute exacerbation [facility] (7/22/2017)      Assessment: slow progress- ,less labile, but remains intrusive,delusion+, tangential    Plan: I will ct to adjust med- Zyprexa, Depakote, will titrate     Noncompliance with treatment (7/22/2017)    Assessment: due to psychosis- court order med- now accepting med    Plan: Educate about the need for medications ,consider Long Acting Antipsychotic med. In summary, Benoit Flowers, is a 30 y.o.  female who presents with a severe exacerbation of the principal diagnosis of Schizoaffective disorder, chronic condition with acute exacerbation ([facility]     Patient's condition is not stable . Patient requires continued inpatient hospitalization for further stabilization, safety monitoring and medication management. I will continue to coordinate the provision of individual, milieu, occupational, group, and substance abuse therapies to address target symptoms/diagnoses as deemed appropriate for the individual patient. A coordinated, multidisplinary treatment team round was conducted with the patient (this team consists of the nurse, psychiatric unit pharmcist,  and writer). Complete current electronic health record for patient has been reviewed today including consultant notes, ancillary staff notes, nurses and psychiatric tech notes. Suicide risk assessment completed and patient deemed to be of low risk for suicide at this time. The following regarding medications was addressed during rounds with patient:   the risks and benefits of the proposed medication. The patient was given the opportunity to ask questions. Informed consent given to the use of the above medications. Will continue to adjust psychiatric and non-psychiatric medications (see above \"medication\" section and orders section for details) as deemed appropriate & based upon diagnoses and response to treatment.      I will continue to order blood tests/labs and diagnostic tests as deemed appropriate and review results as they become available (see orders for details and above listed lab/test results). I will order psychiatric records from previous [facility] hospitals to further elucidate the nature of patient's psychopathology and review once available. I will gather additional collateral information from friends, family and o/p treatment team to further elucidate the nature of patient's psychopathology and baselline level of psychiatric functioning. I certify that this patient's inpatient psychiatric hospital services furnished since the previous certification were, and continue to be, required for treatment that could reasonably be expected to improve the patient's condition, or for diagnostic study, and that the patient continues to need, on a daily basis, active treatment furnished directly by or requiring the supervision of inpatient psychiatric facility personnel. In addition, the hospital records show that services furnished were intensive treatment services, admission or related services, or equivalent services.     EXPECTED DISCHARGE DATE/DAY: TBD     DISPOSITION:    Home       Signed By:   Alf Daniels MD  8/8/2017

## 2017-08-08 NOTE — BH NOTES
GROUP THERAPY PROGRESS NOTE    The patient Mago hartman 30 y.o. female is participating in Creative Expression Group. Group time: 1 hour    Personal goal for participation: To concentrate on selected task    Goal orientation: social    Group therapy participation: active    Therapeutic interventions reviewed and discussed: Crafts, games, music    Impression of participation: The patient was attentive.     Claudio Pearl  8/8/2017  5:01 PM

## 2017-08-08 NOTE — BH NOTES
GROUP THERAPY PROGRESS NOTE    The patient Jami Monterroso a 30 y.o. female is participating in Reflections Group    Group time: 30 minutes    Personal goal for participation: To discuss the daily goals    Goal orientation: personal    Group therapy participation: minimal    Therapeutic interventions reviewed and discussed:  Yes    Impression of participation: lori Durand  8/7/2017  9:42 PM

## 2017-08-08 NOTE — BH NOTES
Behavior  The patient Mariaelena Quiles is alert and oriented.  Her hygiene and nutritional intake  is adequate.  Their behavior is appropriate in the milieu with peers and staff, but presents isolative.  Contracts for safety.  Pt medication compliant. Per medication nurse. Flat affect.  Depressed, sad mood.  Pt is still the same!!!!      Intervention  1:1 interaction to assess mood and thought process. Staff offering assistance as needed.     Response  Pt denies S/H ideations. Pt denies hearing voices At this  times. Pt attending groups. Plan  Plan is to assess mood and thought process Staff encouraging verbalization of issues and feelings in the Appropriate manner Staff will monitor for changes.  Q 15 minute checks continue.

## 2017-08-09 PROCEDURE — 74011250637 HC RX REV CODE- 250/637: Performed by: PSYCHIATRY & NEUROLOGY

## 2017-08-09 PROCEDURE — 65220000003 HC RM SEMIPRIVATE PSYCH

## 2017-08-09 RX ADMIN — OLANZAPINE 10 MG: 10 TABLET, FILM COATED ORAL at 08:33

## 2017-08-09 RX ADMIN — CLONAZEPAM 1 MG: 1 TABLET ORAL at 21:25

## 2017-08-09 RX ADMIN — OLANZAPINE 30 MG: 10 TABLET, FILM COATED ORAL at 21:26

## 2017-08-09 RX ADMIN — DIVALPROEX SODIUM 1500 MG: 500 TABLET, FILM COATED, EXTENDED RELEASE ORAL at 21:25

## 2017-08-09 NOTE — BH NOTES
GROUP THERAPY PROGRESS NOTE    Marcin Westbrook is participating in Process Group. Group time: 30 minutes    Personal goal for participation: Utilize reading to relate personal experiences    Goal orientation: personal    Group therapy participation: active and disruptive    Therapeutic interventions reviewed and discussed:   Reading: Where You Grow -   Pt reported feeling\"hungry and ready to eat\". She stated that she felt that she was growing through concrete in the past and now has rich soil to live in. Pt reported that to enrich her life she wants to be around positive-minded people who are respectful of others and can problem-solve together even after an argument. Impression of participation:   Pt presented with a full affect and euthymic mood. She was an active participant and contributed to discussion. Pts behavior was disruptive due to walking in/out or the day room and interrupting group discussion; yet, her attitude was pleasant and she demonstrated increased use of pro-social skills. Her thoughts demonstrated circumstantiality, desire for immediate gratification, and increased positivity with significantly less contrariness.      LEO Brown, Supervisee in Social Work

## 2017-08-09 NOTE — BH NOTES
Pt continue to improve with her tone, intrusiveness and following propmts and redirection on the first few time. Pt was commended on her improvements and encouraged to keep up the good work. Pt will continue to be monitored for safety.

## 2017-08-09 NOTE — BH NOTES
GROUP THERAPY PROGRESS NOTE    The patient Marichuy hartman 30 y.o. female is participating in Creative Expression Group. Group time: 1 hour    Personal goal for participation: To concentrate on selected task    Goal orientation: social    Group therapy participation: active    Therapeutic interventions reviewed and discussed: Crafts, games, music    Impression of participation: The patient was attentive.     Chandana Loving  8/9/2017  4:12 PM

## 2017-08-09 NOTE — BH NOTES
GROUP THERAPY PROGRESS NOTE    The patient Wendy Douglass a 30 y.o. female is participating in Coping Skills Group. Group time: 45 minutes    Personal goal for participation: To learn about mental illness    Goal orientation:  personal    Group therapy participation: active    Therapeutic interventions reviewed and discussed: educational DVD    Impression of participation:  The patient was attentive.     Ani Loving  8/9/2017  1:26 PM

## 2017-08-09 NOTE — PROGRESS NOTES
Problem: Manic Behavior (Adult/Pediatric)  Goal: *STG: Demonstrates decrease in delusional thinking  Outcome: Progressing Towards Goal  Pt remains manic and hyperverbal for majority of shift. She requires redirection frequently for these manic episodes. PRN ativan PO 1 mg given at 1730. Attention seeking behavior noted. Continues to request the phone a lot. Denies SI/HI and A/V hallucinations. Continuing to monitor pt with q15 min rounds for safety.

## 2017-08-09 NOTE — BH NOTES
PSYCHIATRIC PROGRESS NOTE         Patient Name  Gemma Sarah   Date of Birth 1980   [facility] [id number]   Medical Record Number  [id number]      Age  30 y.o. PCP None   Admit date:  7/21/2017    Room Number  355/00  @ [facility]   Date of Service  8/9/2017          PSYCHOTHERAPY SESSION NOTE:  Length of psychotherapy session: 15 minutes    Main condition/diagnosis/issues treated during session today, 8/9/2017 : compliance coping skills. irritability    I employed Cognitive Behavioral therapy techniques, Reality-Oriented psychotherapy, as well as supportive psychotherapy in regards to various ongoing psychosocial stressors, including the following: pre-admission and current problems; housing issues;occupational issues; academic issues; legal issues; medical issues; and stress of hospitalization. Interpersonal relationship issues and psychodynamic conflicts explored. Attempts made to alleviate maladaptive patterns. We, also, worked on issues of denial & effects of substance dependency/use     Overall, patient is minimally progressing    Treatment Plan Update (reviewed an updated 8/9/2017) : I will modify psychotherapy tx plan by implementing more stress management strategies, building upon cognitive behavioral techniques, increasing coping skills, as well as shoring up psychological defenses). An extended energy and skill set was needed to engage pt in psychotherapy due to some of the following: resistiveness, complexity, negativity, confrontational nature, hostile behaviors, and/or severe abnormalities in thought processes/psychosis resulting in the loss of expressive/receptive language communication skills. E & M PROGRESS NOTE:         HISTORY         HISTORY OF PRESENT ILLNESS/INTERVAL HISTORY:  (reviewed/updated 8/9/2017). CC: \"I am doing perfect\"  \".  Pt admitted under a [facility] long term order (TDO)   for severe psychosis and wendy proving to be/posing an imminent danger to selfand an inability to care for self. HISTORY OF PRESENT ILLNESS:    The patient, Alyson Brothers, is a 30 y.o. WHITE [place]  female with a past psychiatric history significant for Schizoaffective disorder bipolar type , who presents at this time with complaints of (and/or evidence of) the following emotional symptoms: agitation, psychotic behavior, wendy and psychosis. Additional symptomatology include agitation, difficulty sleeping and increased irritability,she was found wandering around ,confuse,disoritented,unkempt , not wearing shoes or undergarments. The above symptoms have been present for one day . These symptoms are of moderate in  severity. These symptoms are constant  in nature. The patient's condition has been precipitated by and psychosocial stressors . treatment noncompliance. UDS: negative; BAL=0. Patient reported that she has been diagnosed with Schizoaffective disorder, since last 2 years she is not taking any medications and not following any psychiatrist. She reported that she has been tried on Tegretol,depakote, lithium ,haldol ,Risperidone and Zyprexa. She only agree to take [address] presents/reports/evidences the following emotional symptoms today, 8/9/2017:delusion. The above symptoms have been present for one day . These symptoms are of moderate in severity. The symptoms are intermittent/ fleeting in nature. Additional symptomatology and features include less labile, intrusive,less Disorganized thought process. Limited insight. Less Irritable in mood. Lot calmer, accepting med. Still interrupting and tangential.      SIDE EFFECTS: (reviewed/updated 8/9/2017)  None reported or admitted to.      ALLERGIES:(reviewed/updated 8/9/2017)  Allergies   Allergen Reactions    Fish Containing Products Anaphylaxis    Sea Food [Seafood] Anaphylaxis      MEDICATIONS PRIOR TO ADMISSION:(reviewed/updated 8/9/2017)  Prescriptions Prior to Admission   Medication Sig    carBAMazepine XR (TEGRETOL XR) 100 mg SR tablet Take 100 mg by mouth two (2) times a day. Indications: Ana associated with Bipolar Disorder, MIXED BIPOLAR I DISORDER      PAST MEDICAL HISTORY: Past medical history from the initial psychiatric evaluation has been reviewed (reviewed/updated 8/9/2017) with no additional updates (I asked patient and no additional past medical history provided). Past Medical History:   Diagnosis Date    Anxiety disorder     Depression     Hypertension     Psychotic disorder      Past Surgical History:   Procedure Laterality Date    HX GYN        SOCIAL HISTORY: Social history from the initial psychiatric evaluation has been reviewed (reviewed/updated 8/9/2017) with no additional updates (I asked patient and no additional social history provided). Social History     Social History    Marital status: SINGLE     Spouse name: N/A    Number of children: N/A    Years of education: N/A     Occupational History    Not on file. Social History Main Topics    Smoking status: Never Smoker    Smokeless tobacco: Never Used    Alcohol use No    Drug use: No    Sexual activity: Yes     Other Topics Concern    Not on file     Social History Narrative    No narrative on file      FAMILY HISTORY: Family history from the initial psychiatric evaluation has been reviewed (reviewed/updated 8/9/2017) with no additional updates (I asked patient and no additional family history provided). History reviewed. No pertinent family history.     REVIEW OF SYSTEMS: (reviewed/updated 8/9/2017)  Appetite:improved   Sleep: 7 hours    All other Review of Systems: Psychological ROS: positive for - hostility, irritability and mood swings  Respiratory ROS: no cough, shortness of breath, or wheezing  Cardiovascular ROS: no chest pain or dyspnea on exertion         MENTAL STATUS EXAM & VITALS     MENTAL STATUS EXAM (MSE):    MSE FINDINGS ARE WITHIN NORMAL LIMITS (WNL) UNLESS OTHERWISE STATED BELOW. ( ALL OF THE BELOW CATEGORIES OF THE MSE HAVE BEEN REVIEWED (reviewed 8/9/2017) AND UPDATED AS DEEMED APPROPRIATE )  General Presentation age appropriate and casually dressed,   Co operative,vague   Orientation oriented to time, place and person   Vital Signs  See below (reviewed 8/9/2017); Vital Signs (BP, Pulse, & Temp) are within normal limits if not listed below. Gait and Station Stable/steady, no ataxia   Musculoskeletal System No extrapyramidal symptoms (EPS); no abnormal muscular movements or Tardive Dyskinesia (TD); muscle strength and tone are within normal limits   Language No aphasia or dysarthria   Speech:  Less hyper verbal   Thought Processes logical; fast rate of thoughts; poor abstract reasoning/computation   Thought Associations tangential,   Thought Content Less Paranoid delusion, free of hallucination   Suicidal Ideations no plan  and no intention   Homicidal Ideations none   Mood:   Irritable ,   Affect:   less labile,less agitated, mood congruent   Memory recent  intact   Memory remote:  intact   Concentration/Attention:  intact   Fund of Knowledge average   Insight:  limited   Reliability poor   Judgment:  poor          VITALS:     Patient Vitals for the past 24 hrs:   Temp Pulse Resp BP   08/09/17 0600 97.2 °F (36.2 °C) 77 18 122/83   08/08/17 1245 96.5 °F (35.8 °C) 82 16 115/77     Wt Readings from Last 3 Encounters:   07/21/17 68 kg (150 lb)     Temp Readings from Last 3 Encounters:   08/09/17 97.2 °F (36.2 °C)     BP Readings from Last 3 Encounters:   08/09/17 122/83     Pulse Readings from Last 3 Encounters:   08/09/17 77            DATA     LABORATORY DATA:(reviewed/updated 8/9/2017)  No results found for this or any previous visit (from the past 24 hour(s)).   Lab Results   Component Value Date/Time    Valproic acid 97 08/01/2017 04:46 AM     No results found for: MANDA   RADIOLOGY REPORTS:(reviewed/updated 8/9/2017)  No results found.       MEDICATIONS     ALL MEDICATIONS:   Current Facility-Administered Medications   Medication Dose Route Frequency    OLANZapine (ZyPREXA) tablet 10 mg  10 mg Oral DAILY    clonazePAM (KlonoPIN) tablet 1 mg  1 mg Oral QHS    OLANZapine (ZyPREXA) tablet 30 mg  30 mg Oral QHS    zolpidem (AMBIEN) tablet 5 mg  5 mg Oral QHS PRN    divalproex ER (DEPAKOTE ER) 24 hour tablet 1,500 mg  1,500 mg Oral QHS    Or    LORazepam (ATIVAN) injection 1 mg +++COURT ORDERED MEDICATION FOR REFUSAL OF PO DEPAKOTE+++  1 mg IntraMUSCular QHS    ziprasidone (GEODON) 20 mg in sterile water (preservative free) 1 mL injection  20 mg IntraMUSCular BID PRN    OLANZapine (ZyPREXA) tablet 5 mg  5 mg Oral Q6H PRN    benztropine (COGENTIN) tablet 2 mg  2 mg Oral BID PRN    benztropine (COGENTIN) injection 2 mg  2 mg IntraMUSCular BID PRN    LORazepam (ATIVAN) injection 2 mg  2 mg IntraMUSCular Q4H PRN    LORazepam (ATIVAN) tablet 1 mg  1 mg Oral Q4H PRN    acetaminophen (TYLENOL) tablet 650 mg  650 mg Oral Q4H PRN    ibuprofen (MOTRIN) tablet 400 mg  400 mg Oral Q8H PRN    magnesium hydroxide (MILK OF MAGNESIA) 400 mg/5 mL oral suspension 30 mL  30 mL Oral DAILY PRN    nicotine (NICODERM CQ) 21 mg/24 hr patch 1 Patch  1 Patch TransDERmal DAILY PRN      SCHEDULED MEDICATIONS:   Current Facility-Administered Medications   Medication Dose Route Frequency    OLANZapine (ZyPREXA) tablet 10 mg  10 mg Oral DAILY    clonazePAM (KlonoPIN) tablet 1 mg  1 mg Oral QHS    OLANZapine (ZyPREXA) tablet 30 mg  30 mg Oral QHS    divalproex ER (DEPAKOTE ER) 24 hour tablet 1,500 mg  1,500 mg Oral QHS    Or    LORazepam (ATIVAN) injection 1 mg +++COURT ORDERED MEDICATION FOR REFUSAL OF PO DEPAKOTE+++  1 mg IntraMUSCular QHS          ASSESSMENT & PLAN     DIAGNOSES REQUIRING ACTIVE TREATMENT AND MONITORING: (reviewed/updated 8/9/2017)  Patient Active Hospital Problem List:   Schizoaffective disorder, chronic condition [facility] (7/22/2017)- bipolar type      Assessment: slow progress- ,less delusional, less labile. Plan: I will ct to adjust med- Zyprexa, Depakote, last Depakote level- 97 (8/1),dc planning     Noncompliance with treatment (7/22/2017)    Assessment: due to psychosis- court order med- now accepting med    Plan: Educate about the need for medications ,consider Long Acting Antipsychotic med. In summary, Dereje Fleming, is a 30 y.o.  female who presents with a severe exacerbation of the principal diagnosis of Schizoaffective disorder, chronic condition with acute exacerbation ([facility]     Patient's condition is not stable . Patient requires continued inpatient hospitalization for further stabilization, safety monitoring and medication management. I will continue to coordinate the provision of individual, milieu, occupational, group, and substance abuse therapies to address target symptoms/diagnoses as deemed appropriate for the individual patient. A coordinated, multidisplinary treatment team round was conducted with the patient (this team consists of the nurse, psychiatric unit pharmcist,  and writer). Complete current electronic health record for patient has been reviewed today including consultant notes, ancillary staff notes, nurses and psychiatric tech notes. Suicide risk assessment completed and patient deemed to be of low risk for suicide at this time. The following regarding medications was addressed during rounds with patient:   the risks and benefits of the proposed medication. The patient was given the opportunity to ask questions. Informed consent given to the use of the above medications. Will continue to adjust psychiatric and non-psychiatric medications (see above \"medication\" section and orders section for details) as deemed appropriate & based upon diagnoses and response to treatment.      I will continue to order blood tests/labs and diagnostic tests as deemed appropriate and review results as they become available (see orders for details and above listed lab/test results). I will order psychiatric records from previous [facility] hospitals to further elucidate the nature of patient's psychopathology and review once available. I will gather additional collateral information from friends, family and o/p treatment team to further elucidate the nature of patient's psychopathology and baselline level of psychiatric functioning. I certify that this patient's inpatient psychiatric hospital services furnished since the previous certification were, and continue to be, required for treatment that could reasonably be expected to improve the patient's condition, or for diagnostic study, and that the patient continues to need, on a daily basis, active treatment furnished directly by or requiring the supervision of inpatient psychiatric facility personnel. In addition, the hospital records show that services furnished were intensive treatment services, admission or related services, or equivalent services.     EXPECTED DISCHARGE DATE/DAY: 2-3 days     DISPOSITION:    Home       Signed By:   Ceci Trivedi MD  8/9/2017

## 2017-08-09 NOTE — PROGRESS NOTES
Problem: Altered Thought Process (Adult/Pediatric)  Goal: *STG: Remains safe in hospital  Outcome: Progressing Towards Goal  Pt visible on unit and less manic this shift. Pt responding more appropriately to staff. Pt Meds and meal compliant with no issues or complaints. No PRNs given this shift. Denies SI/HI and A/V hallucinations. Continuing to monitor with q15 min rounds for safety.

## 2017-08-09 NOTE — PROGRESS NOTES
Ubaldo Swenson was very active in [facility] on [address]  [address] ([id number])

## 2017-08-10 VITALS
HEART RATE: 93 BPM | HEIGHT: 63 IN | WEIGHT: 150 LBS | BODY MASS INDEX: 26.58 KG/M2 | RESPIRATION RATE: 16 BRPM | SYSTOLIC BLOOD PRESSURE: 135 MMHG | DIASTOLIC BLOOD PRESSURE: 76 MMHG | TEMPERATURE: 97.2 F

## 2017-08-10 PROCEDURE — 74011250637 HC RX REV CODE- 250/637: Performed by: PSYCHIATRY & NEUROLOGY

## 2017-08-10 RX ORDER — OLANZAPINE 15 MG/1
30 TABLET ORAL
Qty: 42 TAB | Refills: 0 | Status: SHIPPED | OUTPATIENT
Start: 2017-08-10 | End: 2017-08-31

## 2017-08-10 RX ORDER — OLANZAPINE 10 MG/1
10 TABLET ORAL DAILY
Qty: 14 TAB | Refills: 0 | Status: SHIPPED | OUTPATIENT
Start: 2017-08-10 | End: 2017-08-10

## 2017-08-10 RX ORDER — DIVALPROEX SODIUM 500 MG/1
1500 TABLET, EXTENDED RELEASE ORAL
Qty: 63 TAB | Refills: 0 | Status: SHIPPED | OUTPATIENT
Start: 2017-08-10 | End: 2017-08-31

## 2017-08-10 RX ORDER — OLANZAPINE 15 MG/1
30 TABLET ORAL
Qty: 28 TAB | Refills: 0 | Status: SHIPPED | OUTPATIENT
Start: 2017-08-10 | End: 2017-08-10

## 2017-08-10 RX ORDER — OLANZAPINE 10 MG/1
10 TABLET ORAL DAILY
Qty: 21 TAB | Refills: 0 | Status: SHIPPED | OUTPATIENT
Start: 2017-08-10 | End: 2017-08-31

## 2017-08-10 RX ORDER — CLONAZEPAM 1 MG/1
1 TABLET ORAL
Qty: 21 TAB | Refills: 0 | Status: SHIPPED | OUTPATIENT
Start: 2017-08-10 | End: 2017-08-31

## 2017-08-10 RX ORDER — CLONAZEPAM 1 MG/1
1 TABLET ORAL
Qty: 14 TAB | Refills: 0 | Status: SHIPPED | OUTPATIENT
Start: 2017-08-10 | End: 2017-08-10

## 2017-08-10 RX ORDER — DIVALPROEX SODIUM 500 MG/1
1500 TABLET, EXTENDED RELEASE ORAL
Qty: 42 TAB | Refills: 0 | Status: SHIPPED | OUTPATIENT
Start: 2017-08-10 | End: 2017-08-10

## 2017-08-10 RX ADMIN — OLANZAPINE 10 MG: 10 TABLET, FILM COATED ORAL at 07:40

## 2017-08-10 RX ADMIN — IBUPROFEN 400 MG: 400 TABLET, FILM COATED ORAL at 06:51

## 2017-08-10 NOTE — BH NOTES
GROUP THERAPY PROGRESS NOTE    The patient Korin Drilling a 30 y.o. female is participating in Reflections Group    Group time: 30 minutes    Personal goal for participation: To discuss the daily goals    Goal orientation: personal    Group therapy participation: minimal    Therapeutic interventions reviewed and discussed:  Yes    Impression of participation: lori Winston  8/9/2017  9:24 PM

## 2017-08-10 NOTE — DISCHARGE SUMMARY
PSYCHIATRIC DISCHARGE SUMMARY         IDENTIFICATION:    Patient Name  Alyson Brothers   Date of Birth 1980   [facility] [id number]   Medical Record Number  [id number]      Age  30 y.o. PCP None   Admit date:  7/21/2017    Discharge date: 8/10/2017   Room Number  1/65  @ [address]   Date of Service  8/10/2017            TYPE OF DISCHARGE: REGULAR               CONDITION AT DISCHARGE: improved       PROVISIONAL & DISCHARGE DIAGNOSES:    Problem List  Never Reviewed          Codes Class    * (Principal)Schizoaffective disorder, chronic condition with acute exacerbation ([facility]) ICD-10-CM: F25.8  ICD-9-CM: 295.74         Noncompliance with treatment ICD-10-CM: Z91.19  ICD-9-CM: V15.81         Psychosis ICD-10-CM: F29  ICD-9-CM: 298.9               Active Hospital Problems    *Schizoaffective disorder, chronic condition with acute exacerbation ([facility]      Noncompliance with treatment        DISCHARGE DIAGNOSIS:   Axis I:  SEE ABOVE  Axis II: SEE ABOVE  Axis III: SEE ABOVE  Axis IV:  Non compliance,lack of structure  Axis V:  20 on admission, 65 on discharge      CC & HISTORY OF PRESENT ILLNESS:  The patient, Alyson Brothers, is a 30 y.o. WHITE [place]  female with a past psychiatric history significant for Schizoaffective disorder bipolar type , who presents at this time with complaints of (and/or evidence of) the following emotional symptoms: agitation, psychotic behavior, wendy and psychosis. Additional symptomatology include agitation, difficulty sleeping and increased irritability,she was found wandering around ,confuse,disoritented,unkempt , not wearing shoes or undergarments. The above symptoms have been present for one day . These symptoms are of moderate in  severity. These symptoms are constant  in nature. The patient's condition has been precipitated by and psychosocial stressors . treatment noncompliance. UDS: negative; BAL=0.    Patient reported that she has been diagnosed with Schizoaffective disorder, since last 2 years she is not taking any medications and not following any psychiatrist. She reported that she has been tried on Tegretol,depakote, lithium ,haldol ,Risperidone and Zyprexa. She only agree to take Zyprexa        SOCIAL HISTORY:    Social History     Social History    Marital status: SINGLE     Spouse name: N/A    Number of children: N/A    Years of education: N/A     Occupational History    Not on file. Social History Main Topics    Smoking status: Never Smoker    Smokeless tobacco: Never Used    Alcohol use No    Drug use: No    Sexual activity: Yes     Other Topics Concern    Not on file     Social History Narrative    No narrative on file      FAMILY HISTORY:   History reviewed. No pertinent family history. HOSPITALIZATION COURSE:    Wendy Douglass was admitted to the inpatient psychiatric unit [facility] for acute psychiatric stabilization in regards to symptomatology as described in the HPI above. The differential diagnosis at time of admission included: bipolar dis vs. schizoaffective vs. Schizophrenia. While on the unit Wendy Douglass was involved in individual, group, occupational and milieu therapy. Psychiatric medications were adjusted during this hospitalization including Zyprexa, Depakote and Klonopin. Wendy Douglass demonstrated a .slow, but progressive improvement in overall condition. .Much of patient's depression appeared to be related to situational stressors,effects of drugs of abuse, and psychological factors. Please see individual progress notes for more specific details regarding patient's hospitalization course. At time of discharge, Wendy Douglass is without significant problems of depression psychosis  wendy.  Patient free of suicidal and homicidal ideations (appears to be at very low risk of suicide or homicide) and reports many positive predictive factors in terms of not attempting suicide or homicide. Overall presentation at time of discharge is most consistent with the diagnosis of schizoaffective disorder-bipolar disorder . Patient with request for discharge today. There are no grounds to seek a TDO. Patient has maximized benefit to be derived from acute inpatient psychiatric treatment. All members of the treatment team concur with each other in regards to plans for discharge today per patient's request.  Patient and family are aware and in agreement with discharge and discharge plan. Per my last note:   Schizoaffective disorder, chronic condition with acute exacerbation ([facility] (7/22/2017)- bipolar type      Assessment: slow progress- ,less delusional, less labile. denies SI/HI/AVH. No agitation. Insight is better    Plan: I will ct to adjust med- Zyprexa, Depakote, last Depakote level- 97 (8/1),dc planning         LABS AND IMAGAING:    Labs Reviewed   TSH 3RD GENERATION   LIPID PANEL   VALPROIC ACID   GLUCOSE, POC     Lab Results   Component Value Date/Time    Valproic acid 97 08/01/2017 04:46 AM     Admission on 07/21/2017   Component Date Value Ref Range Status    TSH 07/22/2017 3.26  0.36 - 3.74 uIU/mL Final    LIPID PROFILE 07/22/2017        Final    Cholesterol, total 07/22/2017 132  <200 MG/DL Final    Triglyceride 07/22/2017 74  <150 MG/DL Final    HDL Cholesterol 07/22/2017 65  MG/DL Final    LDL, calculated 07/22/2017 52.2  0 - 100 MG/DL Final    VLDL, calculated 07/22/2017 14.8  MG/DL Final    CHOL/HDL Ratio 07/22/2017 2.0  0 - 5.0   Final    Glucose (POC) 07/29/2017 90  65 - 100 mg/dL Final    Performed by 07/29/2017 Raven Irizarry   Final    Valproic acid 08/01/2017 97  50 - 100 ug/ml Final     No results found. DISPOSITION:    Home. Patient to f/u with psychiatric, and psychotherapy appointments. Patient is to f/u with internist as directed.   Patient should have a depakote level and associated labs checked within the next 1-2 weeks by patient's o/p psychiatrist/internist.               FOLLOW-UP CARE:    Activity as tolerated  Regular Diet  Wound Care: none needed. Follow-up Information     Follow up With Details Comments [address] On 8/14/2017 intake appointment at 9:15 AM to begin process for  and psychiatric medication management. [facility] [address]  [phone]  Emergency services: [phone]      [facility] Go on 8/16/2017 8/16/17 at 12:00 PM   Assessment will be conducted by Latisha Johnson for mental health skill building services [address] [place] [address], [address] [place] [address]  Phone: [phone]  Monday - 8/16/17  at 12:00 PM      None   None ([phone]) Patient stated that they have no PCP                   PROGNOSIS:   Guarded / Poor---- based on nature of patient's pathology/ies and treatment compliance issues. Prognosis is greatly dependent upon patient's ability to follow up with psychiatric/psychotherapy appointments as well as to comply with psychiatric medications as prescribed. DISCHARGE MEDICATIONS:     Informed consent given for the use of following psychotropic medications:  Current Discharge Medication List      START taking these medications    Details   clonazePAM (KLONOPIN) 1 mg tablet Take 1 Tab by mouth nightly for 21 days. Max Daily Amount: 1 mg. Indications: Agitation, anxiety  Qty: 21 Tab, Refills: 0      divalproex ER (DEPAKOTE ER) 500 mg ER tablet Take 3 Tabs by mouth nightly for 21 days. Indications: Schizoaffective disorder  Qty: 63 Tab, Refills: 0      !! OLANZapine (ZYPREXA) 10 mg tablet Take 1 Tab by mouth daily for 21 days. Indications: Ana associated with Bipolar Disorder  Qty: 21 Tab, Refills: 0      !! OLANZapine (ZYPREXA) 15 mg tablet Take 2 Tabs by mouth nightly for 21 days.  Indications: Ana associated with Bipolar Disorder, Schizoaffective Disorder  Qty: 42 Tab, Refills: 0       !! - Potential duplicate medications found. Please discuss with provider. STOP taking these medications       carBAMazepine XR (TEGRETOL XR) 100 mg SR tablet Comments:   Reason for Stopping:                      A coordinated, multidisplinary treatment team round was conducted with Mauricio Boston is done daily here at [facility] This team consists of the nurse, psychiatric unit pharmcist,  and writer. I have spent greater than 35 minutes on discharge work.     Signed:  Adri Summers MD  8/10/2017

## 2017-08-10 NOTE — DISCHARGE INSTRUCTIONS
DISCHARGE SUMMARY from Nurse    The following personal items are in your possession at time of discharge:    Dental Appliances: None  Visual Aid: None     Home Medications: None  Jewelry: None  Clothing: None  Other Valuables: None  Personal Items Sent to Safe: none          PATIENT INSTRUCTIONS:        What to do at Home:    Recommended activity: Activity as tolerated,           *  Please give a list of your current medications to your Primary Care Provider. *  Please update this list whenever your medications are discontinued, doses are      changed, or new medications (including over-the-counter products) are added. *  Please carry medication information at all times in case of emergency situations. These are general instructions for a healthy lifestyle:    No smoking/ No tobacco products/ Avoid exposure to second hand smoke    Surgeon General's Warning:  Quitting smoking now greatly reduces serious risk to your health. Obesity, smoking, and sedentary lifestyle greatly increases your risk for illness    A healthy diet, regular physical exercise & weight monitoring are important for maintaining a healthy lifestyle    You may be retaining fluid if you have a history of heart failure or if you experience any of the following symptoms:  Weight gain of 3 pounds or more overnight or 5 pounds in a week, increased swelling in our hands or feet or shortness of breath while lying flat in bed. Please call your doctor as soon as you notice any of these symptoms; do not wait until your next office visit. Recognize signs and symptoms of STROKE:    F-face looks uneven    A-arms unable to move or move unevenly    S-speech slurred or non-existent    T-time-call 911 as soon as signs and symptoms begin-DO NOT go       Back to bed or wait to see if you get better-TIME IS BRAIN. Warning Signs of HEART ATTACK     Call 911 if you have these symptoms:   Chest discomfort.  Most heart attacks involve discomfort in the center of the chest that lasts more than a few minutes, or that goes away and comes back. It can feel like uncomfortable pressure, squeezing, fullness, or pain.  Discomfort in other areas of the upper body. Symptoms can include pain or discomfort in one or both arms, the back, neck, jaw, or stomach.  Shortness of breath with or without chest discomfort.  Other signs may include breaking out in a cold sweat, nausea, or lightheadedness. Don't wait more than five minutes to call 911 - MINUTES MATTER! Fast action can save your life. Calling 911 is almost always the fastest way to get lifesaving treatment. Emergency Medical Services staff can begin treatment when they arrive -- up to an hour sooner than if someone gets to the hospital by car. The discharge information has been reviewed with the patient. The patient verbalized understanding. Discharge medications reviewed with the patient and appropriate educational materials and side effects teaching were provided. If I feel that I can not keep these promises and I am at risk of hurting myself or others,I will call the crisis office and speak with a crisis worker who will assist me during my crisis.     [facility] [phone]

## 2017-08-10 NOTE — BH NOTES
Pt is alert and oriented x person, place and time. Pt denies any SI/HI or AV hallucinations. Pt denies any depression. Pt plans to return home with ance . Discharge information/Prescriptions reviewed with patient. Pt verbalizes understanding. Pt's belongings/valuables returned. Pt to be transported home by ance.